# Patient Record
Sex: FEMALE | Race: BLACK OR AFRICAN AMERICAN | NOT HISPANIC OR LATINO | Employment: OTHER | URBAN - METROPOLITAN AREA
[De-identification: names, ages, dates, MRNs, and addresses within clinical notes are randomized per-mention and may not be internally consistent; named-entity substitution may affect disease eponyms.]

---

## 2018-07-31 ENCOUNTER — HOSPITAL ENCOUNTER (EMERGENCY)
Facility: HOSPITAL | Age: 58
Discharge: HOME | End: 2018-07-31
Attending: EMERGENCY MEDICINE
Payer: MEDICAID

## 2018-07-31 VITALS
SYSTOLIC BLOOD PRESSURE: 130 MMHG | TEMPERATURE: 98 F | HEIGHT: 66 IN | DIASTOLIC BLOOD PRESSURE: 79 MMHG | OXYGEN SATURATION: 98 % | WEIGHT: 230 LBS | RESPIRATION RATE: 18 BRPM | BODY MASS INDEX: 36.96 KG/M2 | HEART RATE: 95 BPM

## 2018-07-31 DIAGNOSIS — M62.838 MUSCLE SPASMS OF NECK: Primary | ICD-10-CM

## 2018-07-31 DIAGNOSIS — R73.9 HYPERGLYCEMIA: ICD-10-CM

## 2018-07-31 LAB
ALBUMIN SERPL-MCNC: 3.8 G/DL (ref 3.3–5.5)
ALP SERPL-CCNC: 101 U/L (ref 42–141)
BILIRUB SERPL-MCNC: 0.5 MG/DL (ref 0.2–1.6)
BUN SERPL-MCNC: 8 MG/DL (ref 7–22)
CALCIUM SERPL-MCNC: 10.1 MG/DL (ref 8–10.3)
CHLORIDE SERPL-SCNC: 97 MMOL/L (ref 98–108)
CREAT SERPL-MCNC: 0.9 MG/DL (ref 0.6–1.2)
GLUCOSE SERPL-MCNC: 294 MG/DL (ref 73–118)
POC ALT (SGPT): 17 U/L (ref 10–47)
POC AST (SGOT): 25 U/L (ref 11–38)
POC CARDIAC TROPONIN I: 0 NG/ML
POC TCO2: 28 MMOL/L (ref 18–33)
POCT GLUCOSE: 286 MG/DL (ref 70–110)
POCT GLUCOSE: 320 MG/DL (ref 70–110)
POTASSIUM BLD-SCNC: 4 MMOL/L (ref 3.6–5.1)
PROTEIN, POC: 7.8 G/DL (ref 6.4–8.1)
SAMPLE: NORMAL
SODIUM BLD-SCNC: 138 MMOL/L (ref 128–145)

## 2018-07-31 PROCEDURE — 25000003 PHARM REV CODE 250: Performed by: EMERGENCY MEDICINE

## 2018-07-31 PROCEDURE — 93010 ELECTROCARDIOGRAM REPORT: CPT | Mod: ,,, | Performed by: INTERNAL MEDICINE

## 2018-07-31 PROCEDURE — 99284 EMERGENCY DEPT VISIT MOD MDM: CPT | Mod: 25

## 2018-07-31 PROCEDURE — 93005 ELECTROCARDIOGRAM TRACING: CPT

## 2018-07-31 PROCEDURE — 80053 COMPREHEN METABOLIC PANEL: CPT

## 2018-07-31 PROCEDURE — 63600175 PHARM REV CODE 636 W HCPCS: Performed by: EMERGENCY MEDICINE

## 2018-07-31 PROCEDURE — 84484 ASSAY OF TROPONIN QUANT: CPT

## 2018-07-31 PROCEDURE — 85025 COMPLETE CBC W/AUTO DIFF WBC: CPT

## 2018-07-31 PROCEDURE — 96361 HYDRATE IV INFUSION ADD-ON: CPT

## 2018-07-31 PROCEDURE — 96374 THER/PROPH/DIAG INJ IV PUSH: CPT

## 2018-07-31 RX ORDER — AMLODIPINE BESYLATE 10 MG/1
10 TABLET ORAL DAILY
Status: ON HOLD | COMMUNITY
End: 2020-04-27 | Stop reason: HOSPADM

## 2018-07-31 RX ORDER — LISINOPRIL 40 MG/1
40 TABLET ORAL DAILY
COMMUNITY

## 2018-07-31 RX ORDER — FERROUS SULFATE 325(65) MG
325 TABLET ORAL
COMMUNITY

## 2018-07-31 RX ORDER — GLIMEPIRIDE 4 MG/1
4 TABLET ORAL
Status: ON HOLD | COMMUNITY
End: 2020-04-26

## 2018-07-31 RX ORDER — KETOROLAC TROMETHAMINE 10 MG/1
10 TABLET, FILM COATED ORAL EVERY 6 HOURS
Qty: 20 TABLET | Refills: 0 | Status: SHIPPED | OUTPATIENT
Start: 2018-07-31 | End: 2018-11-20 | Stop reason: ALTCHOICE

## 2018-07-31 RX ORDER — PRAVASTATIN SODIUM 80 MG/1
80 TABLET ORAL DAILY
COMMUNITY
End: 2020-08-07

## 2018-07-31 RX ORDER — DIAZEPAM 10 MG/1
10 TABLET ORAL EVERY 8 HOURS PRN
Qty: 12 TABLET | Refills: 0 | Status: SHIPPED | OUTPATIENT
Start: 2018-07-31 | End: 2020-08-13 | Stop reason: CLARIF

## 2018-07-31 RX ORDER — CHLORTHALIDONE 25 MG/1
25 TABLET ORAL DAILY
COMMUNITY

## 2018-07-31 RX ORDER — METOPROLOL SUCCINATE 50 MG/1
100 TABLET, EXTENDED RELEASE ORAL DAILY
COMMUNITY

## 2018-07-31 RX ORDER — POTASSIUM CHLORIDE 750 MG/1
10 CAPSULE, EXTENDED RELEASE ORAL ONCE
COMMUNITY

## 2018-07-31 RX ORDER — KETOROLAC TROMETHAMINE 30 MG/ML
30 INJECTION, SOLUTION INTRAMUSCULAR; INTRAVENOUS
Status: COMPLETED | OUTPATIENT
Start: 2018-07-31 | End: 2018-07-31

## 2018-07-31 RX ORDER — METFORMIN HYDROCHLORIDE 500 MG/1
500 TABLET ORAL 2 TIMES DAILY WITH MEALS
Status: ON HOLD | COMMUNITY
End: 2020-04-27 | Stop reason: HOSPADM

## 2018-07-31 RX ADMIN — KETOROLAC TROMETHAMINE 30 MG: 30 INJECTION, SOLUTION INTRAMUSCULAR at 09:07

## 2018-07-31 RX ADMIN — SODIUM CHLORIDE 1000 ML: 0.9 INJECTION, SOLUTION INTRAVENOUS at 08:07

## 2018-07-31 NOTE — ED PROVIDER NOTES
Encounter Date: 7/31/2018       History     Chief Complaint   Patient presents with    Neck Pain     pt presents to ED with c/o left right sided neck,shoulder,arm pain that started saturday night.     Shoulder Pain    Arm Pain     Chief complaint:  Shoulder pain   57-year-old complains of hard pain to her right shoulder and trapezius area for the last 3 days.  Pain has been constant and worsens with any movement.  She rates her pain as 10/10.  She has been taking ibuprofen without improvement.  She denies trauma.  No chest pain or shortness of breath. She denies weakness or numbness to her arm.  Pain radiates to her finger tips          Review of patient's allergies indicates:  No Known Allergies  Past Medical History:   Diagnosis Date    Anemia     Diabetes mellitus     Hyperlipidemia     Hypertension      History reviewed. No pertinent surgical history.  History reviewed. No pertinent family history.  Social History   Substance Use Topics    Smoking status: Never Smoker    Smokeless tobacco: Never Used    Alcohol use No     Review of Systems   Constitutional: Negative for fever.   HENT: Negative for sore throat.    Respiratory: Negative for shortness of breath.    Cardiovascular: Negative for chest pain.   Gastrointestinal: Negative for nausea.   Genitourinary: Negative for dysuria.   Musculoskeletal: Positive for neck pain. Negative for back pain.        Right shoulder and trapezius area pain   Skin: Negative for rash.   Neurological: Negative for weakness.   All other systems reviewed and are negative.      Physical Exam     Initial Vitals [07/31/18 0826]   BP Pulse Resp Temp SpO2   137/84 95 16 97.5 °F (36.4 °C) 98 %      MAP       --         Physical Exam    Nursing note and vitals reviewed.  Constitutional: She appears well-developed and well-nourished. She appears distressed.   HENT:   Head: Normocephalic and atraumatic.   Eyes: Conjunctivae and EOM are normal. Pupils are equal, round, and  reactive to light.   Neck: Normal range of motion. Neck supple.   Cardiovascular: Normal rate and regular rhythm.   Pulmonary/Chest: Breath sounds normal.   Abdominal: Soft. There is no tenderness. There is no rebound and no guarding.   Musculoskeletal:        Right shoulder: She exhibits decreased range of motion. She exhibits no bony tenderness, no swelling, normal pulse and normal strength.        Arms:  Neurological: She is alert and oriented to person, place, and time. She has normal strength. No sensory deficit.   Skin: Skin is warm and dry.   Psychiatric: She has a normal mood and affect. She is agitated.         ED Course   Procedures  Labs Reviewed   POCT GLUCOSE - Abnormal; Notable for the following:        Result Value    POCT Glucose 320 (*)     All other components within normal limits   POCT GLUCOSE   POCT CMP   POCT TROPONIN     EKG Readings: (Independently Interpreted)   Normal sinus rhythm, heart rate 75, no ST segment elevation, normal QT, normal axis       Imaging Results    None          Medical Decision Making:   Initial Assessment:   57-year-old complains of pain to her right shoulder and trapezius area.  On exam patient is tender to touch over the trapezius.  No neurological deficit  ED Management:  Patient's blood sugar is 320.  She will be given IV fluids.  CMP will be checked.  Patient be given Valium for the pain.  Troponin and EKG will also be done secondary to history of diabetes.  Patient's labs were significant only for hyperglycemia.  Kidney function is normal.  Patient is not anemic.  Troponin is negative.  Patient's blood sugar improved after IV fluids.  Her pain also improved at the Valium and Toradol.  Patient will be discharged on these medications.  She was tries to use a heating pad to her neck.                      Clinical Impression:   The primary encounter diagnosis was Muscle spasms of neck. A diagnosis of Hyperglycemia was also pertinent to this visit.                              Trupti Elizalde MD  07/31/18 0908

## 2018-11-20 ENCOUNTER — OFFICE VISIT (OUTPATIENT)
Dept: ORTHOPEDICS | Facility: CLINIC | Age: 58
End: 2018-11-20
Payer: MEDICAID

## 2018-11-20 ENCOUNTER — TELEPHONE (OUTPATIENT)
Dept: ORTHOPEDICS | Facility: CLINIC | Age: 58
End: 2018-11-20

## 2018-11-20 VITALS — BODY MASS INDEX: 36.96 KG/M2 | WEIGHT: 230 LBS | HEIGHT: 66 IN

## 2018-11-20 DIAGNOSIS — M25.562 PAIN IN BOTH KNEES, UNSPECIFIED CHRONICITY: Primary | ICD-10-CM

## 2018-11-20 DIAGNOSIS — M17.0 PRIMARY OSTEOARTHRITIS OF BOTH KNEES: Primary | ICD-10-CM

## 2018-11-20 DIAGNOSIS — M25.561 PAIN IN BOTH KNEES, UNSPECIFIED CHRONICITY: Primary | ICD-10-CM

## 2018-11-20 PROCEDURE — 99203 OFFICE O/P NEW LOW 30 MIN: CPT | Mod: S$PBB,,, | Performed by: ORTHOPAEDIC SURGERY

## 2018-11-20 PROCEDURE — 99213 OFFICE O/P EST LOW 20 MIN: CPT | Mod: PBBFAC,PN | Performed by: ORTHOPAEDIC SURGERY

## 2018-11-20 PROCEDURE — 99999 PR PBB SHADOW E&M-EST. PATIENT-LVL III: CPT | Mod: PBBFAC,,, | Performed by: ORTHOPAEDIC SURGERY

## 2018-11-20 RX ORDER — MELOXICAM 15 MG/1
15 TABLET ORAL DAILY
Qty: 30 TABLET | Refills: 2 | Status: SHIPPED | OUTPATIENT
Start: 2018-11-20

## 2018-11-20 NOTE — TELEPHONE ENCOUNTER
Left message for patient in regards to RX being called in to [facility] .   ----- Message from Michelle Mendoza sent at 11/20/2018 12:08 PM CST -----  Contact: [phone] or [phone]/ self   Patient requesting to speak with you regarding the following Rx being sent to [facility], [place]. Please advise.     Pharmacy Phone Number-  [phone]    1. meloxicam (MOBIC) 15 MG tablet  Sig: Take 1 tablet (15 mg total) by mouth once daily

## 2018-11-20 NOTE — LETTER
November 20, 2018      Justo Rico MD  [address]  [place] [address]           [facility]  [address]  [place] [address]  Phone: [phone]  Fax: [phone]          Patient: Gala Alcaraz   MR Number: [id number]   YOB: 1960   Date of Visit: 11/20/2018       Dear Dr. Justo Rico:    Thank you for referring Gala Alcaraz to me for evaluation. Attached you will find relevant portions of my assessment and plan of care.    If you have questions, please do not hesitate to call me. I look forward to following Gala Alcaraz along with you.    Sincerely,    Anand Seymour MD    Enclosure  CC:  No Recipients    If you would like to receive this communication electronically, please contact [email or web address] or [phone] to request more information on [facility] access.    For providers and/or their staff who would like to refer a patient to [facility], please contact us through our one-stop-shop provider referral line, [facility], at [phone].    If you feel you have received this communication in error or would no longer like to receive these types of communications, please e-mail [email or web address]

## 2018-11-20 NOTE — PROGRESS NOTES
Subjective:      Patient ID: Gala Alcaraz is a 57 y.o. female.    Chief Complaint: Pain of the Left Knee and Pain of the Right Knee    HPI     They have experienced problems with their bilateral knee over the past 5 years. The patient denies relevant history of injury/aggravation. Pain is located  anteriorly Associated symptoms include swelling and gelling.  Symptoms are aggravated by prolonged walking. They have been treated with NA.   Symptoms have recently stayed the same. Ambulation reportedly has not been impaired. Self care ADLs are not painful.     Review of Systems   Constitution: Negative for fever and weight loss.   HENT: Negative for congestion.    Eyes: Negative for visual disturbance.   Cardiovascular: Negative for chest pain.   Respiratory: Negative for shortness of breath.    Hematologic/Lymphatic: Negative for bleeding problem. Does not bruise/bleed easily.   Skin: Negative for poor wound healing.   Musculoskeletal: Positive for joint pain.   Gastrointestinal: Negative for abdominal pain.   Genitourinary: Negative for dysuria.   Neurological: Negative for seizures.   Psychiatric/Behavioral: Negative for altered mental status.   Allergic/Immunologic: Negative for persistent infections.         Objective:            Ortho/SPM Exam  Right knee  [unfilled]    The patient is not in acute distress.   Sclerae normal  Body habitus is obese.  Respiratory distress:  none   The patient walks without a limp.  Hip irritability  negative.   The skin over the knee is intact.  Knee effusion trace  Tendernes is located none  Range of motion- Flexion 125 deg, Extension 0 deg,   Ligament laxity exam:   MCL 1+   Lachman negative   Post sag negative    LCL 0  Patellar apprehension negative.  Popliteal cyst negative  Patellar crepitation present.  Flexion/pinch not applicable  Pulses DP intact, PT intact.  Motor normal 5/5 strength in all tested muscle groups.   Sensory normal.    The left knee is identical    I reviewed  the relevant radiographic images for the patient's condition:  Recent films of both knees show advanced tricompartmental loss of joint space with varus deformity        Assessment:       Encounter Diagnosis   Name Primary?    Primary osteoarthritis of both knees Yes            The patient's radiographic findings are such that her functional status is better than would be expected.  Plan:       Gala ESPINAL was seen today for pain and pain.    Diagnoses and all orders for this visit:    Primary osteoarthritis of both knees          I explained my diagnostic impression and the reasoning behind it in detail, using layman's terms.  Models and/or pictures were used to help in the explanation.    The usual nonsurgical options were discussed. Considering the patient's report of regular exercise I recommend that we start meloxicam and that she continue on her home exercise program.    I explained to the patient that I am providing a prescription for anti-inflammatory medication.  I warned the patient that it should not be taken with other anti-inflammatory medications including over-the-counter products containing ibuprofen and naproxen.  I advised them to consult their primary physician or pharmacist if there is any question about this in the future.  I discussed the possible side effects including cardiovascular issues, renal issues and gastrointestinal problems.  I advised the patient to discontinue the medication should they develop persistent symptoms of dyspepsia.    I also explained that should they elect to continue this medication long-term, that is beyond the prescription that I provide at this time, they should contact their primary physician for refills and appropriate monitoring.    I explained the potential role of surgery in the treatment of this condition to the patient.  They understand that if nonsurgical measures do not adequately control symptoms, surgery will be considered in the future.

## 2020-04-26 ENCOUNTER — HOSPITAL ENCOUNTER (OUTPATIENT)
Facility: HOSPITAL | Age: 60
Discharge: HOME | End: 2020-04-27
Attending: EMERGENCY MEDICINE | Admitting: EMERGENCY MEDICINE
Payer: MEDICAID

## 2020-04-26 DIAGNOSIS — K21.9 GASTROESOPHAGEAL REFLUX DISEASE, ESOPHAGITIS PRESENCE NOT SPECIFIED: ICD-10-CM

## 2020-04-26 DIAGNOSIS — E78.5 HYPERLIPIDEMIA, UNSPECIFIED HYPERLIPIDEMIA TYPE: Chronic | ICD-10-CM

## 2020-04-26 DIAGNOSIS — R07.9 ACUTE CHEST PAIN: ICD-10-CM

## 2020-04-26 DIAGNOSIS — I10 ESSENTIAL HYPERTENSION: Chronic | ICD-10-CM

## 2020-04-26 DIAGNOSIS — R07.9 CHEST PAIN: ICD-10-CM

## 2020-04-26 DIAGNOSIS — I10 HYPERTENSION, UNSPECIFIED TYPE: ICD-10-CM

## 2020-04-26 DIAGNOSIS — E08.65 DIABETES MELLITUS DUE TO UNDERLYING CONDITION WITH HYPERGLYCEMIA, UNSPECIFIED WHETHER LONG TERM INSULIN USE: ICD-10-CM

## 2020-04-26 DIAGNOSIS — R93.89 ABNORMAL CHEST CT: ICD-10-CM

## 2020-04-26 DIAGNOSIS — R07.9 CHEST PAIN, UNSPECIFIED TYPE: Primary | ICD-10-CM

## 2020-04-26 PROBLEM — E11.9 DM (DIABETES MELLITUS): Chronic | Status: ACTIVE | Noted: 2020-04-26

## 2020-04-26 PROBLEM — D64.9 ANEMIA: Chronic | Status: ACTIVE | Noted: 2020-04-26

## 2020-04-26 PROBLEM — E66.9 OBESITY (BMI 30-39.9): Chronic | Status: ACTIVE | Noted: 2020-04-26

## 2020-04-26 LAB
ALBUMIN SERPL-MCNC: 3.6 G/DL (ref 3.3–5.5)
ALP SERPL-CCNC: 96 U/L (ref 42–141)
BILIRUB SERPL-MCNC: 0.5 MG/DL (ref 0.2–1.6)
BUN SERPL-MCNC: 14 MG/DL (ref 7–22)
CALCIUM SERPL-MCNC: 10.4 MG/DL (ref 8–10.3)
CHLORIDE SERPL-SCNC: 99 MMOL/L (ref 98–108)
CHOLEST SERPL-MCNC: 180 MG/DL (ref 120–199)
CHOLEST/HDLC SERPL: 3.8 {RATIO} (ref 2–5)
CREAT SERPL-MCNC: 0.9 MG/DL (ref 0.6–1.2)
CRP SERPL-MCNC: 6 MG/L (ref 0–8.2)
FERRITIN SERPL-MCNC: 31 NG/ML (ref 20–300)
GLUCOSE SERPL-MCNC: 358 MG/DL (ref 73–118)
HDLC SERPL-MCNC: 48 MG/DL (ref 40–75)
HDLC SERPL: 26.7 % (ref 20–50)
LDH SERPL L TO P-CCNC: 287 U/L (ref 110–260)
LDLC SERPL CALC-MCNC: 95.8 MG/DL (ref 63–159)
NONHDLC SERPL-MCNC: 132 MG/DL
POC ALT (SGPT): 14 U/L (ref 10–47)
POC AST (SGOT): 23 U/L (ref 11–38)
POC B-TYPE NATRIURETIC PEPTIDE: 143 PG/ML (ref 0–100)
POC CARDIAC TROPONIN I: 0 NG/ML
POC D-DI: 594 NG/ML (ref 0–450)
POC PTINR: 1 (ref 0.9–1.2)
POC PTWBT: 12.1 SEC (ref 9.7–14.3)
POC TCO2: 30 MMOL/L (ref 18–33)
POCT GLUCOSE: 348 MG/DL (ref 70–110)
POCT GLUCOSE: 377 MG/DL (ref 70–110)
POTASSIUM BLD-SCNC: 4.4 MMOL/L (ref 3.6–5.1)
PROCALCITONIN SERPL IA-MCNC: 0.03 NG/ML
PROTEIN, POC: 7.3 G/DL (ref 6.4–8.1)
SAMPLE: NORMAL
SAMPLE: NORMAL
SARS-COV-2 RDRP RESP QL NAA+PROBE: NEGATIVE
SODIUM BLD-SCNC: 140 MMOL/L (ref 128–145)
TRIGL SERPL-MCNC: 181 MG/DL (ref 30–150)
TROPONIN I SERPL DL<=0.01 NG/ML-MCNC: 0.03 NG/ML (ref 0–0.03)
TSH SERPL DL<=0.005 MIU/L-ACNC: 0.49 UIU/ML (ref 0.4–4)

## 2020-04-26 PROCEDURE — 83615 LACTATE (LD) (LDH) ENZYME: CPT

## 2020-04-26 PROCEDURE — 84484 ASSAY OF TROPONIN QUANT: CPT

## 2020-04-26 PROCEDURE — U0002 COVID-19 LAB TEST NON-CDC: HCPCS

## 2020-04-26 PROCEDURE — 82728 ASSAY OF FERRITIN: CPT

## 2020-04-26 PROCEDURE — 99291 CRITICAL CARE FIRST HOUR: CPT | Mod: 25,ER

## 2020-04-26 PROCEDURE — 80061 LIPID PANEL: CPT

## 2020-04-26 PROCEDURE — 96374 THER/PROPH/DIAG INJ IV PUSH: CPT

## 2020-04-26 PROCEDURE — 84145 PROCALCITONIN (PCT): CPT

## 2020-04-26 PROCEDURE — 85025 COMPLETE CBC W/AUTO DIFF WBC: CPT | Mod: ER

## 2020-04-26 PROCEDURE — 93010 ELECTROCARDIOGRAM REPORT: CPT | Mod: ,,, | Performed by: INTERNAL MEDICINE

## 2020-04-26 PROCEDURE — 84484 ASSAY OF TROPONIN QUANT: CPT | Mod: ER

## 2020-04-26 PROCEDURE — 36415 COLL VENOUS BLD VENIPUNCTURE: CPT

## 2020-04-26 PROCEDURE — 25000003 PHARM REV CODE 250: Performed by: HOSPITALIST

## 2020-04-26 PROCEDURE — 85610 PROTHROMBIN TIME: CPT | Mod: ER

## 2020-04-26 PROCEDURE — 86140 C-REACTIVE PROTEIN: CPT

## 2020-04-26 PROCEDURE — 96372 THER/PROPH/DIAG INJ SC/IM: CPT | Mod: 59

## 2020-04-26 PROCEDURE — 63600175 PHARM REV CODE 636 W HCPCS: Performed by: HOSPITALIST

## 2020-04-26 PROCEDURE — 93010 EKG 12-LEAD: ICD-10-PCS | Mod: ,,, | Performed by: INTERNAL MEDICINE

## 2020-04-26 PROCEDURE — 80053 COMPREHEN METABOLIC PANEL: CPT | Mod: ER

## 2020-04-26 PROCEDURE — 25000003 PHARM REV CODE 250: Mod: ER | Performed by: EMERGENCY MEDICINE

## 2020-04-26 PROCEDURE — 25500020 PHARM REV CODE 255: Mod: ER | Performed by: EMERGENCY MEDICINE

## 2020-04-26 PROCEDURE — 84443 ASSAY THYROID STIM HORMONE: CPT

## 2020-04-26 PROCEDURE — 83880 ASSAY OF NATRIURETIC PEPTIDE: CPT | Mod: ER

## 2020-04-26 PROCEDURE — C9399 UNCLASSIFIED DRUGS OR BIOLOG: HCPCS | Performed by: HOSPITALIST

## 2020-04-26 PROCEDURE — 93005 ELECTROCARDIOGRAM TRACING: CPT | Mod: ER

## 2020-04-26 PROCEDURE — 82962 GLUCOSE BLOOD TEST: CPT | Mod: ER

## 2020-04-26 PROCEDURE — 85379 FIBRIN DEGRADATION QUANT: CPT | Mod: ER

## 2020-04-26 PROCEDURE — C9113 INJ PANTOPRAZOLE SODIUM, VIA: HCPCS | Performed by: HOSPITALIST

## 2020-04-26 PROCEDURE — 25000242 PHARM REV CODE 250 ALT 637 W/ HCPCS: Mod: ER | Performed by: EMERGENCY MEDICINE

## 2020-04-26 RX ORDER — ASPIRIN 325 MG
325 TABLET ORAL
Status: COMPLETED | OUTPATIENT
Start: 2020-04-26 | End: 2020-04-26

## 2020-04-26 RX ORDER — ACETAMINOPHEN 325 MG/1
650 TABLET ORAL EVERY 8 HOURS PRN
Status: DISCONTINUED | OUTPATIENT
Start: 2020-04-26 | End: 2020-04-27 | Stop reason: HOSPADM

## 2020-04-26 RX ORDER — SODIUM CHLORIDE 0.9 % (FLUSH) 0.9 %
10 SYRINGE (ML) INJECTION
Status: DISCONTINUED | OUTPATIENT
Start: 2020-04-26 | End: 2020-04-27 | Stop reason: HOSPADM

## 2020-04-26 RX ORDER — NITROGLYCERIN 0.4 MG/1
0.4 TABLET SUBLINGUAL EVERY 5 MIN PRN
Status: DISCONTINUED | OUTPATIENT
Start: 2020-04-26 | End: 2020-04-27 | Stop reason: HOSPADM

## 2020-04-26 RX ORDER — INSULIN ASPART 100 [IU]/ML
1-10 INJECTION, SOLUTION INTRAVENOUS; SUBCUTANEOUS
Status: DISCONTINUED | OUTPATIENT
Start: 2020-04-26 | End: 2020-04-27 | Stop reason: HOSPADM

## 2020-04-26 RX ORDER — ONDANSETRON 2 MG/ML
4 INJECTION INTRAMUSCULAR; INTRAVENOUS EVERY 8 HOURS PRN
Status: DISCONTINUED | OUTPATIENT
Start: 2020-04-26 | End: 2020-04-27 | Stop reason: HOSPADM

## 2020-04-26 RX ORDER — NAPROXEN SODIUM 220 MG/1
81 TABLET, FILM COATED ORAL DAILY
Status: DISCONTINUED | OUTPATIENT
Start: 2020-04-27 | End: 2020-04-27 | Stop reason: HOSPADM

## 2020-04-26 RX ORDER — ENOXAPARIN SODIUM 100 MG/ML
40 INJECTION SUBCUTANEOUS ONCE
Status: DISCONTINUED | OUTPATIENT
Start: 2020-04-26 | End: 2020-04-27

## 2020-04-26 RX ORDER — INSULIN GLARGINE 100 [IU]/ML
12 INJECTION, SOLUTION SUBCUTANEOUS NIGHTLY
COMMUNITY

## 2020-04-26 RX ORDER — FAMOTIDINE 20 MG/1
20 TABLET, FILM COATED ORAL 2 TIMES DAILY
Status: DISCONTINUED | OUTPATIENT
Start: 2020-04-26 | End: 2020-04-26

## 2020-04-26 RX ORDER — GLUCAGON 1 MG
1 KIT INJECTION
Status: DISCONTINUED | OUTPATIENT
Start: 2020-04-26 | End: 2020-04-27 | Stop reason: HOSPADM

## 2020-04-26 RX ORDER — IBUPROFEN 200 MG
16 TABLET ORAL
Status: DISCONTINUED | OUTPATIENT
Start: 2020-04-26 | End: 2020-04-27 | Stop reason: HOSPADM

## 2020-04-26 RX ORDER — TALC
6 POWDER (GRAM) TOPICAL NIGHTLY PRN
Status: DISCONTINUED | OUTPATIENT
Start: 2020-04-26 | End: 2020-04-27 | Stop reason: HOSPADM

## 2020-04-26 RX ORDER — PANTOPRAZOLE SODIUM 40 MG/10ML
40 INJECTION, POWDER, LYOPHILIZED, FOR SOLUTION INTRAVENOUS ONCE
Status: DISCONTINUED | OUTPATIENT
Start: 2020-04-26 | End: 2020-04-26

## 2020-04-26 RX ORDER — CHLORTHALIDONE 25 MG/1
25 TABLET ORAL DAILY
Status: DISCONTINUED | OUTPATIENT
Start: 2020-04-27 | End: 2020-04-27 | Stop reason: HOSPADM

## 2020-04-26 RX ORDER — PANTOPRAZOLE SODIUM 40 MG/10ML
40 INJECTION, POWDER, LYOPHILIZED, FOR SOLUTION INTRAVENOUS DAILY
Status: DISCONTINUED | OUTPATIENT
Start: 2020-04-26 | End: 2020-04-27 | Stop reason: HOSPADM

## 2020-04-26 RX ORDER — ENOXAPARIN SODIUM 100 MG/ML
40 INJECTION SUBCUTANEOUS EVERY 24 HOURS
Status: DISCONTINUED | OUTPATIENT
Start: 2020-04-26 | End: 2020-04-27 | Stop reason: HOSPADM

## 2020-04-26 RX ORDER — LISINOPRIL 20 MG/1
40 TABLET ORAL DAILY
Status: DISCONTINUED | OUTPATIENT
Start: 2020-04-27 | End: 2020-04-27 | Stop reason: HOSPADM

## 2020-04-26 RX ORDER — INSULIN ASPART 100 [IU]/ML
8 INJECTION, SOLUTION INTRAVENOUS; SUBCUTANEOUS
COMMUNITY

## 2020-04-26 RX ORDER — PRAVASTATIN SODIUM 40 MG/1
80 TABLET ORAL DAILY
Status: DISCONTINUED | OUTPATIENT
Start: 2020-04-27 | End: 2020-04-27

## 2020-04-26 RX ORDER — FERROUS SULFATE 325(65) MG
325 TABLET, DELAYED RELEASE (ENTERIC COATED) ORAL DAILY
Status: DISCONTINUED | OUTPATIENT
Start: 2020-04-27 | End: 2020-04-27 | Stop reason: HOSPADM

## 2020-04-26 RX ORDER — NITROGLYCERIN 0.4 MG/1
0.4 TABLET SUBLINGUAL
Status: COMPLETED | OUTPATIENT
Start: 2020-04-26 | End: 2020-04-26

## 2020-04-26 RX ORDER — IBUPROFEN 200 MG
24 TABLET ORAL
Status: DISCONTINUED | OUTPATIENT
Start: 2020-04-26 | End: 2020-04-27 | Stop reason: HOSPADM

## 2020-04-26 RX ORDER — METOPROLOL SUCCINATE 50 MG/1
100 TABLET, EXTENDED RELEASE ORAL DAILY
Status: DISCONTINUED | OUTPATIENT
Start: 2020-04-27 | End: 2020-04-27 | Stop reason: HOSPADM

## 2020-04-26 RX ORDER — ENOXAPARIN SODIUM 100 MG/ML
40 INJECTION SUBCUTANEOUS ONCE
Status: DISCONTINUED | OUTPATIENT
Start: 2020-04-26 | End: 2020-04-26

## 2020-04-26 RX ADMIN — IOHEXOL 100 ML: 350 INJECTION, SOLUTION INTRAVENOUS at 02:04

## 2020-04-26 RX ADMIN — INSULIN DETEMIR 8 UNITS: 100 INJECTION, SOLUTION SUBCUTANEOUS at 09:04

## 2020-04-26 RX ADMIN — NITROGLYCERIN 0.4 MG: 0.4 TABLET SUBLINGUAL at 01:04

## 2020-04-26 RX ADMIN — PANTOPRAZOLE SODIUM 40 MG: 40 INJECTION, POWDER, FOR SOLUTION INTRAVENOUS at 08:04

## 2020-04-26 RX ADMIN — ASPIRIN 325 MG ORAL TABLET 325 MG: 325 PILL ORAL at 12:04

## 2020-04-26 RX ADMIN — MELATONIN 6 MG: at 08:04

## 2020-04-26 RX ADMIN — ENOXAPARIN SODIUM 40 MG: 40 INJECTION SUBCUTANEOUS at 08:04

## 2020-04-26 NOTE — ED TRIAGE NOTES
Pt to er c/o midsternal CP for 1 week without improvement--denies trauma, cough, SOB, and fever--tender to palp--Tylenol  Decreases pain per pt--mask in place

## 2020-04-26 NOTE — ASSESSMENT & PLAN NOTE
The patient has been counseled on the negative impact that obesity imparts on her health and was encouraged to make lifestyle changes in order to lose weight and decrease her modifiable risk factors.  - ADA/Cardiac Diet  -Nursing Education and reference material (pamphlets)

## 2020-04-26 NOTE — ED NOTES
Attempted to call report and was told that the house supervisor had not decided where the pt is going to be assigned, so they will call back--[facility] and charge RN notified

## 2020-04-26 NOTE — SUBJECTIVE & OBJECTIVE
Past Medical History:   Diagnosis Date    Anemia     Diabetes mellitus     Hyperlipidemia     Hypertension        Past Surgical History:   Procedure Laterality Date    HYSTERECTOMY         Review of patient's allergies indicates:  No Known Allergies    No current facility-administered medications on file prior to encounter.      Current Outpatient Medications on File Prior to Encounter   Medication Sig    chlorthalidone (HYGROTEN) 25 MG Tab Take 25 mg by mouth once daily.    ferrous sulfate 325 mg (65 mg iron) Tab tablet Take 325 mg by mouth daily with breakfast.    glimepiride (AMARYL) 4 MG tablet Take 4 mg by mouth before breakfast.    lisinopril (PRINIVIL,ZESTRIL) 40 MG tablet Take 40 mg by mouth once daily.    meloxicam (MOBIC) 15 MG tablet Take 1 tablet (15 mg total) by mouth once daily.    metoprolol succinate (TOPROL-XL) 50 MG 24 hr tablet Take 50 mg by mouth once daily.    potassium chloride (MICRO-K) 10 MEQ CpSR Take 10 mEq by mouth once.    pravastatin (PRAVACHOL) 80 MG tablet Take 80 mg by mouth once daily.    amLODIPine (NORVASC) 10 MG tablet Take 10 mg by mouth once daily.    diazePAM (VALIUM) 10 MG Tab Take 1 tablet (10 mg total) by mouth every 8 (eight) hours as needed (muscle spasm).    metFORMIN (GLUCOPHAGE) 500 MG tablet Take 500 mg by mouth 2 (two) times daily with meals.    SITagliptin (JANUVIA) 50 MG Tab Take 50 mg by mouth once daily.     Family History     None        Tobacco Use    Smoking status: Never Smoker    Smokeless tobacco: Never Used   Substance and Sexual Activity    Alcohol use: No    Drug use: Not on file    Sexual activity: Not on file     Review of Systems   Constitutional: Negative for appetite change, fatigue and fever.   HENT: Negative for congestion, ear pain, postnasal drip, rhinorrhea, sneezing and sore throat.    Eyes: Negative for photophobia and visual disturbance.   Respiratory: Negative for cough, shortness of breath and wheezing.     Cardiovascular: Positive for chest pain. Negative for leg swelling.   Gastrointestinal: Negative for abdominal distention, abdominal pain, constipation, diarrhea, nausea and vomiting.   Genitourinary: Negative for dysuria, enuresis, frequency, hematuria and urgency.   Musculoskeletal: Negative for back pain and joint swelling.   Skin: Negative for color change.   Neurological: Negative for syncope, weakness, light-headedness, numbness and headaches.   Hematological: Does not bruise/bleed easily.   Psychiatric/Behavioral: Negative for agitation and confusion.     Objective:     Vital Signs (Most Recent):  Temp: 98.2 °F (36.8 °C) (04/26/20 1333)  Pulse: (!) 58 (04/26/20 1453)  Resp: 20 (04/26/20 1333)  BP: (!) 169/90 (04/26/20 1453)  SpO2: 98 % (04/26/20 1453) Vital Signs (24h Range):  Temp:  [98.2 °F (36.8 °C)] 98.2 °F (36.8 °C)  Pulse:  [58-68] 58  Resp:  [18-20] 20  SpO2:  [98 %-100 %] 98 %  BP: (131-222)/() 169/90     Weight: 99.8 kg (220 lb)  Body mass index is 34.98 kg/m².    Physical Exam   Constitutional: She is oriented to person, place, and time. She appears well-developed and well-nourished.   HENT:   Head: Normocephalic and atraumatic.   Mouth/Throat: Oropharynx is clear and moist.   Eyes: Conjunctivae are normal.   Neck: Normal range of motion. Neck supple.   Cardiovascular: Normal rate, regular rhythm, normal heart sounds and intact distal pulses.   No murmur heard.  Pulmonary/Chest: Effort normal and breath sounds normal. She has no wheezes. She has no rales.   Abdominal: Soft. Bowel sounds are normal. She exhibits no distension. There is no tenderness. There is no rebound.   Musculoskeletal: Normal range of motion. She exhibits no edema.   Neurological: She is alert and oriented to person, place, and time. She has normal strength. GCS eye subscore is 4. GCS verbal subscore is 5. GCS motor subscore is 6.   Skin: Skin is warm and dry. Capillary refill takes less than 2 seconds.   Psychiatric: She  has a normal mood and affect. Her behavior is normal. Thought content normal.   Nursing note and vitals reviewed.          Significant Labs:   CBC: No results for input(s): WBC, HGB, HCT, PLT in the last 48 hours.  CMP: No results for input(s): NA, K, CL, CO2, GLU, BUN, CREATININE, CALCIUM, PROT, ALBUMIN, BILITOT, ALKPHOS, AST, ALT, ANIONGAP, EGFRNONAA in the last 48 hours.    Invalid input(s): ESTGFAFRICA  Cardiac Markers: No results for input(s): CKMB, MYOGLOBIN, BNP, TROPISTAT in the last 48 hours.  POCT Glucose:   Recent Labs   Lab 04/26/20  1231   POCTGLUCOSE 348*     Troponin: No results for input(s): TROPONINI in the last 48 hours.  All pertinent labs within the past 24 hours have been reviewed.    Significant Imaging: I have reviewed all pertinent imaging results/findings within the past 24 hours.

## 2020-04-26 NOTE — ASSESSMENT & PLAN NOTE
Poorly Controlled,systolic blood pressure 131-222's  -Vitals  -BMP  -Continue home Lisinopril,Metoprolol tartrate,Chlorthalidone and Pravastatin

## 2020-04-26 NOTE — ED NOTES
Pt to be admitted and transferred to [facility]--NAD--pt resting without complaint--will monitor

## 2020-04-26 NOTE — ASSESSMENT & PLAN NOTE
Poorly-controlled,Random glucose 358 today. Last QktS5p-5.3 on 03/04/20  -[facility] POCT glucose monitoring  -ADA diet  -Basal and Correctional scale insulin,adjust as warranted.

## 2020-04-26 NOTE — ASSESSMENT & PLAN NOTE
Patient currently with  chest pain.  HEART score of 5, serial cardiac markers, which thus far have been negative. EKG reveals no acute ischemia;Chest X-ray reveals bibasilar hazy opacification;CTA chest- bilateral lower lobe ground-glass findings most suggestive of atelectasis.  -Telemetry  -Vitals  -Serial cardiac enzymes   -ASA  -Nitro SL prn  -Supplemental oxygen at 2L NC PRN SOB  -2D Echo  -UA,TSH, lipid panel

## 2020-04-26 NOTE — HPI
"Gala Alcaraz 59 y.o. female PMHx: Hypertension, Diabetes mellitus,HLD, and anemia  presenting with complaints of chest pain x 1 week. Reports continuous pounding midsternal chest pain," feels like someone is hitting me in the chest", non-radiating,and no contributing/alleviating factors. Denies SOB,NVD, focal deficits, numbness or tingling in any extremity, leg or calve pain. Denies ETOH,smoking or illegal substances use.    In ED hypertensive,bradycardia, labs reveals elevated levels of BNP,LD, D-dimer and  (-) COVID. EKG- no acute ischemia,CTA chest- no pulmonary emboli although there is bilateral lower lobe ground-glass findings most suggestive of atelectasis. Received ASA and sublingual Nitroglycerin,chest pain improved,not resolved. Placed in observation to Hospital Medicine for further evaluation and treatment.          "

## 2020-04-26 NOTE — NURSING
Report received from [facility] ED nurse Kimberly.  Patient awaiting ambulance transport to [facility].

## 2020-04-26 NOTE — ED PROVIDER NOTES
Encounter Date: 4/26/2020    SCRIBE #1 NOTE: I, Danyelle Warner, am scribing for, and in the presence of,  Dr. Hernadez. I have scribed the following portions of the note - Other sections scribed: HPI, ROS, PE.       History     Chief Complaint   Patient presents with    Chest Pain     MIDSTERNAL NON RADIATING CHEST PAIN X 3 DAYS; PT STATES IS FEELS LIKE A PRESSURE; DENIES SOB AND N/V     Gala Alcaraz is a 59 y.o. female who presents to the ED complaining of mid sternal non radiating chest pain x 3 days. Patient describes pain as pressure like. She denies SOB, nausea, or vomiting. No alleviating or exacerbating factors. Reports she takes her routine daily medications with good compliance, took blood pressure and diabetes medication this morning.  Has not tried anything for pain.  States sometimes it feels pressure and sometimes it feels like is sticking pain and sometimes pounding.  She denies shortness of breath.  She denies a cough or recent fevers.      The history is provided by the patient. No  was used.     Review of patient's allergies indicates:  No Known Allergies  Past Medical History:   Diagnosis Date    Anemia     Diabetes mellitus     Hyperlipidemia     Hypertension      Past Surgical History:   Procedure Laterality Date    HYSTERECTOMY       No family history on file.  Social History     Tobacco Use    Smoking status: Never Smoker    Smokeless tobacco: Never Used   Substance Use Topics    Alcohol use: No    Drug use: Not on file     Review of Systems   Respiratory: Negative for shortness of breath.    Cardiovascular: Positive for chest pain.   Gastrointestinal: Negative for nausea and vomiting.   All other systems reviewed and are negative.      Physical Exam     Initial Vitals [04/26/20 1223]   BP Pulse Resp Temp SpO2   (!) 222/116 62 18 98.2 °F (36.8 °C) 98 %      MAP       --         Physical Exam    Nursing note and vitals reviewed.  Constitutional: She appears  well-developed and well-nourished.   HENT:   Head: Normocephalic and atraumatic.   Mouth/Throat: Oropharynx is clear and moist.   Eyes: Conjunctivae are normal.   Neck: Normal range of motion. Neck supple.   Cardiovascular: Normal rate, regular rhythm and normal heart sounds.   No murmur heard.  Pulmonary/Chest: Breath sounds normal. No respiratory distress. She has no wheezes. She has no rales.   Abdominal: Soft. She exhibits no distension. There is no tenderness. There is no rebound.   Musculoskeletal: Normal range of motion. She exhibits no edema.   Good perfusion in the extremities.  No calf tenderness or swelling.   Neurological: She is alert and oriented to person, place, and time. She has normal strength. GCS score is 15. GCS eye subscore is 4. GCS verbal subscore is 5. GCS motor subscore is 6.   Skin: Skin is warm and dry.   Psychiatric: She has a normal mood and affect. Thought content normal.         ED Course   Critical Care  Date/Time: 4/26/2020 3:16 PM  Performed by: Tavo Hernadez MD  Authorized by: Tavo Hernadez MD   Direct patient critical care time: 5 minutes  Additional history critical care time: 5 minutes  Ordering / reviewing critical care time: 10 minutes  Documentation critical care time: 5 minutes  Consulting other physicians critical care time: 5 minutes  Total critical care time (exclusive of procedural time) : 30 minutes        Labs Reviewed   LACTATE DEHYDROGENASE - Abnormal; Notable for the following components:       Result Value     (*)     All other components within normal limits   POCT GLUCOSE - Abnormal; Notable for the following components:    POCT Glucose 348 (*)     All other components within normal limits   POCT CMP - Abnormal; Notable for the following components:    Calcium, POC 10.4 (*)     POC Glucose 358 (*)     All other components within normal limits   POCT D DIMER - Abnormal; Notable for the following components:    POC D- (*)     All other  components within normal limits   POCT B-TYPE NATRIURETIC PEPTIDE (BNP) - Abnormal; Notable for the following components:    POC B-Type Natriuretic Peptide 143 (*)     All other components within normal limits   TROPONIN ISTAT   SARS-COV-2 RNA AMPLIFICATION, QUAL    Narrative:     What symptom criteria does the patient meet?->Other (specify)  Please specify:->chest pain   C-REACTIVE PROTEIN   PROCALCITONIN   FERRITIN   POCT CBC   POCT GLUCOSE, HAND-HELD DEVICE   POCT CMP   POCT PROTIME-INR   POCT TROPONIN   POCT B-TYPE NATRIURETIC PEPTIDE (BNP)   POCT D DIMER   ISTAT PROCEDURE         EKG Readings: (Independently Interpreted)   Initial Reading: No STEMI. Previous EKG: Compared with most recent EKG Previous EKG Date: 07/31/2018. Rhythm: Sinus Bradycardia. Heart Rate: 59 bpm . Ectopy: No Ectopy. T Waves Flipped: III and AVF. Other Impression: Abnormal EKG. T wave abnormality.        Imaging Results          CTA Chest Non-Coronary (PE Study) (Final result)  Result time 04/26/20 14:17:30    Final result by Clovis Alfonso MD (04/26/20 14:17:30)                 Impression:      1. No pulmonary embolus identified  2. Cardiomegaly.  Bilateral lower lung ground-glass findings felt to be related to atelectasis/hypoaeration changes.  Correlate clinically.      Electronically signed by: Clovis Alfonso MD  Date:    04/26/2020  Time:    14:17             Narrative:    EXAMINATION:  CTA CHEST NON CORONARY    CLINICAL HISTORY:  Dyspnea, cardiac origin suspected;    TECHNIQUE:  Low dose axial images, sagittal and coronal reformations were obtained from the thoracic inlet to the lung bases following the IV administration of 100 mL of Omnipaque 350.  Contrast timing was optimized to evaluate the pulmonary arteries.  MIP images were performed.    COMPARISON:  Prior radiograph    FINDINGS:  No pulmonary emboli appreciated.    Thoracic aorta and great vessels are unremarkable.  Cardiac enlargement noted without pericardial or  pleural effusion.  No pathologically enlarged axillary, mediastinal or hilar lymph nodes noted.    Lungs demonstrate bilateral lower lobe ground-glass findings most suggestive of atelectasis.    Visualized upper abdominal structures demonstrate no acute abnormality.  No acute osseous abnormality.                               X-Ray Chest AP Portable (Final result)  Result time 04/26/20 13:03:45    Final result by Kenneth Rosales MD (04/26/20 13:03:45)                 Impression:      Bibasilar hazy opacification which could be related to artifact from chest wall soft tissues with underpenetration; however, small pleural effusion with atelectasis/infiltrate particularly on the left not entirely excluded the proper clinical setting.      Electronically signed by: Kenneth Rosales MD  Date:    04/26/2020  Time:    13:03             Narrative:    EXAMINATION:  XR CHEST AP PORTABLE    CLINICAL HISTORY:  Chest Pain;    TECHNIQUE:  Single frontal view of the chest was performed.    COMPARISON:  None    FINDINGS:  Resolution is limited by body habitus with underpenetration.    Cardiac silhouette is upper limits of normal in size without evidence of failure.  Mediastinal contours are within normal limits.  Pulmonary vasculature and hilar contours are within normal limits.    Hazy opacification of the bilateral lung bases, more so on the left with poor visualization of the left costophrenic angle.  The upper lung zones are clear.  No pneumothorax.  No acute osseous process seen.  PA and lateral views can be obtained.                                 Medical Decision Making:   History:   Old Medical Records: I decided to obtain old medical records.  Independently Interpreted Test(s):   I have ordered and independently interpreted X-rays - see prior notes.  I have ordered and independently interpreted EKG Reading(s) - see prior notes  Clinical Tests:   Lab Tests: Ordered and Reviewed  Radiological Study: Ordered and  "Reviewed  Medical Tests: Ordered and Reviewed  ED Management:  Pt reports improvement of pain with nitro.   Rapid covid negative.   I do, however, still have clinical suspicion that patient may have covid, given abnormal chest xray and chest CT.   "bilateral ground glass opacities most c/w atelectasis", no overt pneumonia.  No PE  Labs noted, wnl, wbc not elevated, first troponin negative.   Heart score 5. Pt needs to be placed in observation to further risk stratify and rule out acs.   Will need to stay on isolation since I cannot clinically rule out covid at this time.   Discussed plan w EDGARDO Miles with .         Additional MDM:   Heart Score:    History:          Moderately suspicious.  ECG:             Nonspecific repolarisation disturbance  Age:               45-65 years  Risk factors: >= 3 risk factors or history of atherosclerotic disease  Troponin:       Less than or equal to normal limit  Final Score: 5             Scribe Attestation:   Scribe #1: I performed the above scribed service and the documentation accurately describes the services I performed. I attest to the accuracy of the note.                          Clinical Impression:     1. Chest pain, unspecified type    2. Chest pain    3. Abnormal chest CT    4. Hypertension, unspecified type    5. Diabetes mellitus due to underlying condition with hyperglycemia, unspecified whether long term insulin use                ED Disposition Condition    Observation                           Tavo Hernadez MD  04/26/20 0951    "

## 2020-04-26 NOTE — ASSESSMENT & PLAN NOTE
-Lipid panel  -Continue home Pravachol (pravastatin), ASA  -Cardiac/Low cholesterol diet

## 2020-04-27 VITALS
SYSTOLIC BLOOD PRESSURE: 130 MMHG | HEART RATE: 62 BPM | HEIGHT: 66 IN | OXYGEN SATURATION: 96 % | WEIGHT: 220 LBS | BODY MASS INDEX: 35.36 KG/M2 | TEMPERATURE: 98 F | DIASTOLIC BLOOD PRESSURE: 79 MMHG | RESPIRATION RATE: 16 BRPM

## 2020-04-27 LAB
ALBUMIN SERPL BCP-MCNC: 3.1 G/DL (ref 3.5–5.2)
ALP SERPL-CCNC: 95 U/L (ref 55–135)
ALT SERPL W/O P-5'-P-CCNC: 8 U/L (ref 10–44)
ANION GAP SERPL CALC-SCNC: 9 MMOL/L (ref 8–16)
AORTIC ROOT ANNULUS: 3.24 CM
AORTIC VALVE CUSP SEPERATION: 2.07 CM
ASCENDING AORTA: 2.78 CM
AST SERPL-CCNC: 9 U/L (ref 10–40)
AV INDEX (PROSTH): 0.76
AV MEAN GRADIENT: 5 MMHG
AV PEAK GRADIENT: 9 MMHG
AV VALVE AREA: 2.28 CM2
AV VELOCITY RATIO: 0.68
BASOPHILS # BLD AUTO: 0.02 K/UL (ref 0–0.2)
BASOPHILS NFR BLD: 0.3 % (ref 0–1.9)
BILIRUB SERPL-MCNC: 0.4 MG/DL (ref 0.1–1)
BSA FOR ECHO PROCEDURE: 2.16 M2
BUN SERPL-MCNC: 13 MG/DL (ref 8–23)
CALCIUM SERPL-MCNC: 9.6 MG/DL (ref 8.7–10.5)
CHLORIDE SERPL-SCNC: 101 MMOL/L (ref 95–110)
CO2 SERPL-SCNC: 28 MMOL/L (ref 23–29)
CREAT SERPL-MCNC: 1.2 MG/DL (ref 0.5–1.4)
CV ECHO LV RWT: 0.46 CM
DIFFERENTIAL METHOD: ABNORMAL
DOP CALC AO PEAK VEL: 1.52 M/S
DOP CALC AO VTI: 28.99 CM
DOP CALC LVOT AREA: 3 CM2
DOP CALC LVOT DIAMETER: 1.96 CM
DOP CALC LVOT PEAK VEL: 1.04 M/S
DOP CALC LVOT STROKE VOLUME: 66.22 CM3
DOP CALCLVOT PEAK VEL VTI: 21.96 CM
E WAVE DECELERATION TIME: 349.6 MSEC
E/A RATIO: 0.8
E/E' RATIO: 14 M/S
ECHO LV POSTERIOR WALL: 1.05 CM (ref 0.6–1.1)
EOSINOPHIL # BLD AUTO: 0 K/UL (ref 0–0.5)
EOSINOPHIL NFR BLD: 0.5 % (ref 0–8)
ERYTHROCYTE [DISTWIDTH] IN BLOOD BY AUTOMATED COUNT: 15.1 % (ref 11.5–14.5)
EST. GFR  (AFRICAN AMERICAN): 56 ML/MIN/1.73 M^2
EST. GFR  (NON AFRICAN AMERICAN): 49 ML/MIN/1.73 M^2
FRACTIONAL SHORTENING: 28 % (ref 28–44)
GLUCOSE SERPL-MCNC: 320 MG/DL (ref 70–110)
HCT VFR BLD AUTO: 37 % (ref 37–48.5)
HGB BLD-MCNC: 11.1 G/DL (ref 12–16)
IMM GRANULOCYTES # BLD AUTO: 0.02 K/UL (ref 0–0.04)
IMM GRANULOCYTES NFR BLD AUTO: 0.3 % (ref 0–0.5)
INTERVENTRICULAR SEPTUM: 1.06 CM (ref 0.6–1.1)
IVRT: 131.49 MSEC
LA MAJOR: 5.74 CM
LA MINOR: 5.69 CM
LA WIDTH: 3.52 CM
LEFT ATRIUM SIZE: 2.2 CM
LEFT ATRIUM VOLUME INDEX: 18.1 ML/M2
LEFT ATRIUM VOLUME: 37.62 CM3
LEFT INTERNAL DIMENSION IN SYSTOLE: 3.33 CM (ref 2.1–4)
LEFT VENTRICLE DIASTOLIC VOLUME INDEX: 47.01 ML/M2
LEFT VENTRICLE DIASTOLIC VOLUME: 97.92 ML
LEFT VENTRICLE MASS INDEX: 82 G/M2
LEFT VENTRICLE SYSTOLIC VOLUME INDEX: 21.6 ML/M2
LEFT VENTRICLE SYSTOLIC VOLUME: 45.03 ML
LEFT VENTRICULAR INTERNAL DIMENSION IN DIASTOLE: 4.61 CM (ref 3.5–6)
LEFT VENTRICULAR MASS: 171.57 G
LV LATERAL E/E' RATIO: 12.6 M/S
LV SEPTAL E/E' RATIO: 15.75 M/S
LYMPHOCYTES # BLD AUTO: 2.9 K/UL (ref 1–4.8)
LYMPHOCYTES NFR BLD: 38.1 % (ref 18–48)
MCH RBC QN AUTO: 23 PG (ref 27–31)
MCHC RBC AUTO-ENTMCNC: 30 G/DL (ref 32–36)
MCV RBC AUTO: 77 FL (ref 82–98)
MONOCYTES # BLD AUTO: 0.7 K/UL (ref 0.3–1)
MONOCYTES NFR BLD: 9.7 % (ref 4–15)
MV PEAK A VEL: 0.79 M/S
MV PEAK E VEL: 0.63 M/S
NEUTROPHILS # BLD AUTO: 3.9 K/UL (ref 1.8–7.7)
NEUTROPHILS NFR BLD: 51.1 % (ref 38–73)
NRBC BLD-RTO: 0 /100 WBC
PISA TR MAX VEL: 1.12 M/S
PLATELET # BLD AUTO: 260 K/UL (ref 150–350)
PMV BLD AUTO: 10.9 FL (ref 9.2–12.9)
POCT GLUCOSE: 288 MG/DL (ref 70–110)
POCT GLUCOSE: 290 MG/DL (ref 70–110)
POTASSIUM SERPL-SCNC: 3.9 MMOL/L (ref 3.5–5.1)
PROT SERPL-MCNC: 6.7 G/DL (ref 6–8.4)
PULM VEIN S/D RATIO: 1.31
PV PEAK D VEL: 0.32 M/S
PV PEAK S VEL: 0.42 M/S
PV PEAK VELOCITY: 1.1 CM/S
RA MAJOR: 5.13 CM
RA PRESSURE: 3 MMHG
RA WIDTH: 3.5 CM
RBC # BLD AUTO: 4.83 M/UL (ref 4–5.4)
RIGHT VENTRICULAR END-DIASTOLIC DIMENSION: 2.81 CM
RV TISSUE DOPPLER FREE WALL SYSTOLIC VELOCITY 1 (APICAL 4 CHAMBER VIEW): 14.72 CM/S
SINUS: 2.86 CM
SODIUM SERPL-SCNC: 138 MMOL/L (ref 136–145)
STJ: 2.48 CM
TDI LATERAL: 0.05 M/S
TDI SEPTAL: 0.04 M/S
TDI: 0.05 M/S
TR MAX PG: 5 MMHG
TRICUSPID ANNULAR PLANE SYSTOLIC EXCURSION: 2.58 CM
TROPONIN I SERPL DL<=0.01 NG/ML-MCNC: 0.01 NG/ML (ref 0–0.03)
TROPONIN I SERPL DL<=0.01 NG/ML-MCNC: 0.03 NG/ML (ref 0–0.03)
TV REST PULMONARY ARTERY PRESSURE: 8 MMHG
WBC # BLD AUTO: 7.62 K/UL (ref 3.9–12.7)

## 2020-04-27 PROCEDURE — 80053 COMPREHEN METABOLIC PANEL: CPT

## 2020-04-27 PROCEDURE — C9113 INJ PANTOPRAZOLE SODIUM, VIA: HCPCS | Performed by: HOSPITALIST

## 2020-04-27 PROCEDURE — 96372 THER/PROPH/DIAG INJ SC/IM: CPT

## 2020-04-27 PROCEDURE — 25000003 PHARM REV CODE 250: Performed by: HOSPITALIST

## 2020-04-27 PROCEDURE — 63600175 PHARM REV CODE 636 W HCPCS: Performed by: HOSPITALIST

## 2020-04-27 PROCEDURE — 96376 TX/PRO/DX INJ SAME DRUG ADON: CPT | Mod: 59

## 2020-04-27 PROCEDURE — 84484 ASSAY OF TROPONIN QUANT: CPT

## 2020-04-27 PROCEDURE — 85025 COMPLETE CBC W/AUTO DIFF WBC: CPT

## 2020-04-27 PROCEDURE — 84484 ASSAY OF TROPONIN QUANT: CPT | Mod: 91

## 2020-04-27 PROCEDURE — 36415 COLL VENOUS BLD VENIPUNCTURE: CPT

## 2020-04-27 RX ORDER — ATORVASTATIN CALCIUM 40 MG/1
40 TABLET, FILM COATED ORAL NIGHTLY
Status: DISCONTINUED | OUTPATIENT
Start: 2020-04-27 | End: 2020-04-27 | Stop reason: HOSPADM

## 2020-04-27 RX ORDER — PANTOPRAZOLE SODIUM 40 MG/1
40 TABLET, DELAYED RELEASE ORAL DAILY
Qty: 45 TABLET | Refills: 0 | Status: SHIPPED | OUTPATIENT
Start: 2020-04-27

## 2020-04-27 RX ADMIN — ASPIRIN 81 MG CHEWABLE TABLET 81 MG: 81 TABLET CHEWABLE at 09:04

## 2020-04-27 RX ADMIN — LISINOPRIL 40 MG: 20 TABLET ORAL at 09:04

## 2020-04-27 RX ADMIN — CHLORTHALIDONE 25 MG: 25 TABLET ORAL at 09:04

## 2020-04-27 RX ADMIN — PRAVASTATIN SODIUM 80 MG: 40 TABLET ORAL at 09:04

## 2020-04-27 RX ADMIN — INSULIN ASPART 6 UNITS: 100 INJECTION, SOLUTION INTRAVENOUS; SUBCUTANEOUS at 10:04

## 2020-04-27 RX ADMIN — METOPROLOL SUCCINATE 100 MG: 50 TABLET, EXTENDED RELEASE ORAL at 09:04

## 2020-04-27 RX ADMIN — FERROUS SULFATE TAB EC 325 MG (65 MG FE EQUIVALENT) 325 MG: 325 (65 FE) TABLET DELAYED RESPONSE at 09:04

## 2020-04-27 RX ADMIN — PANTOPRAZOLE SODIUM 40 MG: 40 INJECTION, POWDER, FOR SOLUTION INTRAVENOUS at 09:04

## 2020-04-27 NOTE — H&P
"[facility] Hospital Medicine  History & Physical    Patient Name: Gala Alcaraz  MRN: [id number]  Admission Date: 4/26/2020  Attending Physician: Mayra Flores MD   Primary Care Provider: Justo Rico MD         Patient information was obtained from patient and ER records.     Subjective:     Principal Problem:Chest pain    Chief Complaint:   Chief Complaint   Patient presents with    Chest Pain     MIDSTERNAL NON RADIATING CHEST PAIN X 3 DAYS; PT STATES IS FEELS LIKE A PRESSURE; DENIES SOB AND N/V        HPI: Gala Alcaraz 59 y.o. female PMHx: Hypertension, Diabetes mellitus,HLD, and anemia  presenting with complaints of chest pain x 1 week. Reports continuous pounding midsternal chest pain," feels like someone is hitting me in the chest", non-radiating,and no contributing/alleviating factors. Denies SOB,NVD, focal deficits, numbness or tingling in any extremity, leg or calve pain. Denies ETOH,smoking or illegal substances use.    In ED hypertensive,bradycardia, labs reveals elevated levels of BNP,LD, D-dimer and  (-) COVID. EKG- no acute ischemia,CTA chest- no pulmonary emboli although there is bilateral lower lobe ground-glass findings most suggestive of atelectasis. Received ASA and sublingual Nitroglycerin,chest pain improved,not resolved. Placed in observation to Hospital Medicine for further evaluation and treatment.            Past Medical History:   Diagnosis Date    Anemia     Diabetes mellitus     Hyperlipidemia     Hypertension        Past Surgical History:   Procedure Laterality Date    HYSTERECTOMY         Review of patient's allergies indicates:  No Known Allergies    No current facility-administered medications on file prior to encounter.      Current Outpatient Medications on File Prior to Encounter   Medication Sig    chlorthalidone (HYGROTEN) 25 MG Tab Take 25 mg by mouth once daily.    ferrous sulfate 325 mg (65 mg iron) Tab tablet Take 325 mg by mouth daily with " breakfast.    glimepiride (AMARYL) 4 MG tablet Take 4 mg by mouth before breakfast.    lisinopril (PRINIVIL,ZESTRIL) 40 MG tablet Take 40 mg by mouth once daily.    meloxicam (MOBIC) 15 MG tablet Take 1 tablet (15 mg total) by mouth once daily.    metoprolol succinate (TOPROL-XL) 50 MG 24 hr tablet Take 50 mg by mouth once daily.    potassium chloride (MICRO-K) 10 MEQ CpSR Take 10 mEq by mouth once.    pravastatin (PRAVACHOL) 80 MG tablet Take 80 mg by mouth once daily.    amLODIPine (NORVASC) 10 MG tablet Take 10 mg by mouth once daily.    diazePAM (VALIUM) 10 MG Tab Take 1 tablet (10 mg total) by mouth every 8 (eight) hours as needed (muscle spasm).    metFORMIN (GLUCOPHAGE) 500 MG tablet Take 500 mg by mouth 2 (two) times daily with meals.    SITagliptin (JANUVIA) 50 MG Tab Take 50 mg by mouth once daily.     Family History     None        Tobacco Use    Smoking status: Never Smoker    Smokeless tobacco: Never Used   Substance and Sexual Activity    Alcohol use: No    Drug use: Not on file    Sexual activity: Not on file     Review of Systems   Constitutional: Negative for appetite change, fatigue and fever.   HENT: Negative for congestion, ear pain, postnasal drip, rhinorrhea, sneezing and sore throat.    Eyes: Negative for photophobia and visual disturbance.   Respiratory: Negative for cough, shortness of breath and wheezing.    Cardiovascular: Positive for chest pain. Negative for leg swelling.   Gastrointestinal: Negative for abdominal distention, abdominal pain, constipation, diarrhea, nausea and vomiting.   Genitourinary: Negative for dysuria, enuresis, frequency, hematuria and urgency.   Musculoskeletal: Negative for back pain and joint swelling.   Skin: Negative for color change.   Neurological: Negative for syncope, weakness, light-headedness, numbness and headaches.   Hematological: Does not bruise/bleed easily.   Psychiatric/Behavioral: Negative for agitation and confusion.      Objective:     Vital Signs (Most Recent):  Temp: 98.2 °F (36.8 °C) (04/26/20 1333)  Pulse: (!) 58 (04/26/20 1453)  Resp: 20 (04/26/20 1333)  BP: (!) 169/90 (04/26/20 1453)  SpO2: 98 % (04/26/20 1453) Vital Signs (24h Range):  Temp:  [98.2 °F (36.8 °C)] 98.2 °F (36.8 °C)  Pulse:  [58-68] 58  Resp:  [18-20] 20  SpO2:  [98 %-100 %] 98 %  BP: (131-222)/() 169/90     Weight: 99.8 kg (220 lb)  Body mass index is 34.98 kg/m².    Physical Exam   Constitutional: She is oriented to person, place, and time. She appears well-developed and well-nourished.   HENT:   Head: Normocephalic and atraumatic.   Mouth/Throat: Oropharynx is clear and moist.   Eyes: Conjunctivae are normal.   Neck: Normal range of motion. Neck supple.   Cardiovascular: Normal rate, regular rhythm, normal heart sounds and intact distal pulses.   No murmur heard.  Pulmonary/Chest: Effort normal and breath sounds normal. She has no wheezes. She has no rales.   Abdominal: Soft. Bowel sounds are normal. She exhibits no distension. There is no tenderness. There is no rebound.   Musculoskeletal: Normal range of motion. She exhibits no edema.   Neurological: She is alert and oriented to person, place, and time. She has normal strength. GCS eye subscore is 4. GCS verbal subscore is 5. GCS motor subscore is 6.   Skin: Skin is warm and dry. Capillary refill takes less than 2 seconds.   Psychiatric: She has a normal mood and affect. Her behavior is normal. Thought content normal.   Nursing note and vitals reviewed.          Significant Labs:   CBC: No results for input(s): WBC, HGB, HCT, PLT in the last 48 hours.  CMP: No results for input(s): NA, K, CL, CO2, GLU, BUN, CREATININE, CALCIUM, PROT, ALBUMIN, BILITOT, ALKPHOS, AST, ALT, ANIONGAP, EGFRNONAA in the last 48 hours.    Invalid input(s): ESTGFAFRICA  Cardiac Markers: No results for input(s): CKMB, MYOGLOBIN, BNP, TROPISTAT in the last 48 hours.  POCT Glucose:   Recent Labs   Lab 04/26/20  1231    POCTGLUCOSE 348*     Troponin: No results for input(s): TROPONINI in the last 48 hours.  All pertinent labs within the past 24 hours have been reviewed.    Significant Imaging: I have reviewed all pertinent imaging results/findings within the past 24 hours.    Assessment/Plan:     * Chest pain  Patient currently with  chest pain.  HEART score of 5, serial cardiac markers, which thus far have been negative. EKG reveals no acute ischemia;Chest X-ray reveals bibasilar hazy opacification;CTA chest- bilateral lower lobe ground-glass findings most suggestive of atelectasis.  -Telemetry  -Vitals  -Serial cardiac enzymes   -ASA  -Nitro SL prn  -Supplemental oxygen at 2L NC PRN SOB  -2D Echo  -UA,TSH, lipid panel    Obesity (BMI 30-39.9)  The patient has been counseled on the negative impact that obesity imparts on her health and was encouraged to make lifestyle changes in order to lose weight and decrease her modifiable risk factors.  - ADA/Cardiac Diet  -Nursing Education and reference material (pamphlets)      Anemia  Stable at patients baseline.  -CBC    HLD (hyperlipidemia)  -Lipid panel  -Continue home Pravachol (pravastatin), ASA  -Cardiac/Low cholesterol diet    Hypertension  Poorly Controlled,systolic blood pressure 131-222's  -Vitals  -BMP  -Continue home Lisinopril,Metoprolol tartrate,Chlorthalidone and Pravastatin      DM (diabetes mellitus)  Poorly-controlled,Random glucose 358 today. Last PecR0t-1.3 on 03/04/20  -ACHS POCT glucose monitoring  -ADA diet  -Basal and Correctional scale insulin,adjust as warranted.          VTE Risk Mitigation (From admission, onward)         Ordered     enoxaparin injection 40 mg  Daily      04/26/20 1955     IP VTE HIGH RISK PATIENT  Once      04/26/20 1953     Place sequential compression device  Until discontinued      04/26/20 1953                   Nav Williamson, APRN, FNP-C  Hospitalist - Department of Hospital Medicine  [facility] - [address],  [place] [address]  Office [phone]; Pager [phone]

## 2020-04-27 NOTE — PLAN OF CARE
04/27/20 1343   Final Note   Assessment Type Final Discharge Note   Anticipated Discharge Disposition Home   Hospital Follow Up  Appt(s) scheduled? Yes   Discharge plans and expectations educations in teach back method with documentation complete? Yes   Right Care Referral Info   Post Acute Recommendation No Care   Post-Acute Status   Post-Acute Authorization Other   Other Status No Post-Acute Service Needs   pts nurse James notified that the pt can d/c from CM standpoint.

## 2020-04-27 NOTE — PROGRESS NOTES
"Discharge Instructions for High Blood Pressure (Hypertension)  You have been diagnosed with high blood pressure (also called hypertension). This means the force of blood against your artery walls is too strong. It also means your heart is working hard to move blood. High blood pressure usually has no symptoms, but over time, it can damage your heart, blood vessels, eyes, kidneys, and other organs. With help from your doctor, you can manage your blood pressure and protect your health.  Taking medicine  · Learn to take your own blood pressure. Keep a record of your results. Ask your doctor which readings mean that you need medical attention.  · Take your blood pressure medicine exactly as directed. Dont skip doses. Missing doses can cause your blood pressure to get out of control.  · If you do miss a dose (or doses) check with your healthcare provider about what to do.  · Avoid medicine that contain heart stimulants, including over-the-counter drugs. Check for warnings about high blood pressure on the label. Ask the pharmacist before purchasing something you haven't used before  · Check with your doctor or pharmacist before taking a decongestant. Some decongestants can worsen high blood pressure.  Lifestyle changes  · Maintain a healthy weight. Get help to lose any extra pounds.  · Cut back on salt.  ? Limit canned, dried, packaged, and fast foods.  ? Dont add salt to your food at the table.  ? Season foods with herbs instead of salt when you cook.  ? Request no added salt when you go to a restaurant.  ? The American Heart Associations (AHA) "ideal" sodium intake recommendation is 1,500 milligrams per day.  However, since American's eat so much salt, the AHA says a positive change can occur by cutting back to even 2,400 milligrams of sodium a day.   · Follow the DASH (Dietary Approaches to Stop Hypertension) eating plan. This plan recommends vegetables, fruits, whole gains, and other heart healthy foods.  · Begin " an exercise program. Ask your doctor how to get started. The American Heart Association recommends aerobic exercise 3 to 4 times a week for an average of 40 minutes at a time, with your doctor's approval. Simple activities like walking or gardening can help.  · Break the smoking habit. Enroll in a stop-smoking program to improve your chances of success. Ask your healthcare provider about programs and medicines to help you stop smoking.  · Limit drinks that contain caffeine (coffee, black or green tea, cola) to 2 per day.  · Never take stimulants such as amphetamines or cocaine; these drugs can be deadly for someone with high blood pressure.  · Control your stress. Learn stress-management techniques.  · Limit alcohol to no more than 1 drink a day for women and 2 drinks a day for men.  Follow-up care  Make a follow-up appointment as directed by our staff.     When to seek medical care  Call your doctor immediately or seek emergency care if you have any of the following:  · Chest pain or shortness of breath (call 911)  · Moderate to severe headache  · Weakness in the muscles of your face, arms, or legs  · Trouble speaking  · Extreme drowsiness  · Confusion  · Fainting or dizziness  · Pulsating or rushing sound in your ears  · Unexplained nosebleed  · Weakness, tingling, or numbness of your face, arms, or legs  · Change in vision  ·

## 2020-04-27 NOTE — DISCHARGE SUMMARY
"[facility] [place], [place] Medicine  Discharge Summary      Patient Name: Gala Alcaraz  MRN: [id number]  Admission Date: 4/26/2020  Hospital Length of Stay: 0 days  Discharge Date and Time:  04/27/2020 11:07 AM  Attending Physician: Mayra Flores MD   Discharging Provider: NITHYA Razo  Primary Care Provider: Justo Rico MD      HPI:   Gala Alcaraz 59 y.o. female PMHx: Hypertension, Diabetes mellitus,HLD, and anemia  presenting with complaints of chest pain x 1 week. Reports continuous pounding midsternal chest pain," feels like someone is hitting me in the chest", non-radiating,and no contributing/alleviating factors. Denies SOB,NVD, focal deficits, numbness or tingling in any extremity, leg or calve pain. Denies ETOH,smoking or illegal substances use.    In ED hypertensive,bradycardia, labs reveals elevated levels of BNP,LD, D-dimer and  (-) COVID. EKG- no acute ischemia,CTA chest- no pulmonary emboli although there is bilateral lower lobe ground-glass findings most suggestive of atelectasis. Received ASA and sublingual Nitroglycerin,chest pain improved,not resolved. Placed in observation to Hospital Medicine for further evaluation and treatment.            * No surgery found *      Hospital Course:   Placed in observation for evaluation of atypical chest pain. Her ELVA score = 1. Noted to have grossly elevated bp at the time of presentation = 222/116. Her symptoms resolved with restarting her home medications. She is also noted on home medications to be taking diazepam 10 mg prn which suggests a component of anxiety related to her poorly controlled bp. Additionally, cannot exclude GI for her mid-sternal/epigastric pain. She was noted in chart review to have recently been seen at [facility] last month - she had CT Abdomen Pelvis with Contrast which showed a fat-containing umbilical hernia and possible reflux, "(Final result) Result time 03/04/20  Impression:   1. Minimal fullness of both " ureters down to the bladder, nonspecific and likely physiologic. This could reflect chronic reflux as well.  2. Very small fat-containing umbilical hernia without bowel involvement.  3. Mild ascending and transverse colonic constipation.    She is stable at this time and will discharge to home - Her BP normalized with restarting her home medications. She will be referred to cardiology for outpatient follow up and workup as warranted. Chest pain free, EKG non-ischemic. Normal initial troponin that trended up on second mildly to 0.28 thought to be directly related to demand as her BP was 222/116 at the time of presentation. Troponin trended to normal ruled out ACS - BP elevation Resolved with restarting home meds and diazepam. Also referral to GI to evaluate the hernia and likely reflux disease. PPI trial.         Consults:   Consults (From admission, onward)        Status Ordering Provider     Inpatient consult to Social Work/Case Management  Once     Provider:  (Not yet assigned)    Acknowledged CHLOE BADILLO          No new Assessment & Plan notes have been filed under this hospital service since the last note was generated.  Service: Hospital Medicine    Final Active Diagnoses:    Diagnosis Date Noted POA    PRINCIPAL PROBLEM:  Chest pain [R07.9] 04/26/2020 Yes    DM (diabetes mellitus) [E11.9] 04/26/2020 Yes     Chronic    Hypertension [I10] 04/26/2020 Yes     Chronic    HLD (hyperlipidemia) [E78.5] 04/26/2020 Yes     Chronic    Anemia [D64.9] 04/26/2020 Yes     Chronic    Obesity (BMI 30-39.9) [E66.9] 04/26/2020 Yes     Chronic      Problems Resolved During this Admission:       Discharged Condition: stable    Disposition: Home or Self Care    Follow Up:  Follow-up Information     Justo Rico MD In 2 weeks.    Specialty:  General Practice  Why:  Call the office to schedule appointment  Contact information:  [address] [place] [address] [phone]             Antwan Valencia MD.     Specialty:  Cardiology  Why:  outpatient cardiac follow up and workup as warranted  Contact information:  [address]  [address]  [place] [id number] [phone]             Leopoldo Lau MD In 1 week.    Specialty:  Gastroenterology  Why:  Call the office to schedule appointment, For outpatient follow-up/post hospitalizaton  Contact information:  [address] [place], [place] [address]  [address]  [facility]  [place] [address] [phone]                 Patient Instructions:      Ambulatory referral/consult to Gastroenterology   Standing Status: Future   Referral Priority: Routine Referral Type: Consultation   Referral Reason: Specialty Services Required   Requested Specialty: Gastroenterology   Number of Visits Requested: 1     Diet Cardiac     Diet diabetic     Activity as tolerated       Significant Diagnostic Studies: Labs:   CMP   Recent Labs   Lab 04/27/20  0311      K 3.9      CO2 28   *   BUN 13   CREATININE 1.2   CALCIUM 9.6   PROT 6.7   ALBUMIN 3.1*   BILITOT 0.4   ALKPHOS 95   AST 9*   ALT 8*   ANIONGAP 9   ESTGFRAFRICA 56*   EGFRNONAA 49*   , CBC   Recent Labs   Lab 04/27/20  0311   WBC 7.62   HGB 11.1*   HCT 37.0      , INR No results found for: INR, PROTIME, Lipid Panel   Lab Results   Component Value Date    CHOL 180 04/26/2020    HDL 48 04/26/2020    LDLCALC 95.8 04/26/2020    TRIG 181 (H) 04/26/2020    CHOLHDL 26.7 04/26/2020   , Troponin   Recent Labs   Lab 04/27/20  0956   TROPONINI 0.010    and A1C: No results for input(s): HGBA1C in the last 4320 hours.     Medications:  Reconciled Home Medications:      Medication List      START taking these medications    pantoprazole 40 MG tablet  Commonly known as:  PROTONIX  Take 1 tablet (40 mg total) by mouth once daily.        CONTINUE taking these medications    chlorthalidone 25 MG Tab  Commonly known as:  HYGROTEN  Take 25 mg by mouth once daily.     diazePAM 10 MG Tab  Commonly known as:   VALIUM  Take 1 tablet (10 mg total) by mouth every 8 (eight) hours as needed (muscle spasm).     ferrous sulfate 325 mg (65 mg iron) Tab tablet  Commonly known as:  FEOSOL  Take 325 mg by mouth daily with breakfast.     insulin aspart U-100 100 unit/mL injection  Commonly known as:  NOVOLOG  Inject 8 Units into the skin 3 (three) times daily before meals.     LANTUS SOLOSTAR U-100 INSULIN glargine 100 units/mL (3mL) SubQ pen  Generic drug:  insulin  Inject 12 Units into the skin every evening.     lisinopriL 40 MG tablet  Commonly known as:  PRINIVIL,ZESTRIL  Take 40 mg by mouth once daily.     meloxicam 15 MG tablet  Commonly known as:  MOBIC  Take 1 tablet (15 mg total) by mouth once daily.     metoprolol succinate 50 MG 24 hr tablet  Commonly known as:  TOPROL-XL  Take 100 mg by mouth once daily.     potassium chloride 10 MEQ Cpsr  Commonly known as:  MICRO-K  Take 10 mEq by mouth once.     pravastatin 80 MG tablet  Commonly known as:  PRAVACHOL  Take 80 mg by mouth once daily.        STOP taking these medications    amLODIPine 10 MG tablet  Commonly known as:  NORVASC     metFORMIN 500 MG tablet  Commonly known as:  GLUCOPHAGE            Indwelling Lines/Drains at time of discharge:   Lines/Drains/Airways     None                 Time spent on the discharge of patient: 35 minutes  Patient was seen and examined on the date of discharge and determined to be suitable for discharge.       JOSÉ MIGUEL Johnson, FNP-C  Hospitalist - Department of Hospital Medicine  [address], [place] [address]  Office [phone]; Pager [phone]

## 2020-04-27 NOTE — PLAN OF CARE
04/27/20 1020   Discharge Assessment   Assessment Type Discharge Planning Assessment   Assessment information obtained from? Medical Record   Prior to hospitilization cognitive status: Alert/Oriented   Prior to hospitalization functional status: Independent   Current cognitive status: Alert/Oriented   Current Functional Status: Independent   Facility Arrived From: home   Lives With other (see comments)   Able to Return to Prior Arrangements yes   Is patient able to care for self after discharge? Yes   Who are your caregiver(s) and their phone number(s)? Chiquita- [phone]   Patient's perception of discharge disposition home or selfcare   Readmission Within the Last 30 Days no previous admission in last 30 days   Patient currently being followed by outpatient case management? No   Patient currently receives any other outside agency services? No   Equipment Currently Used at Home none   Do you have any problems affording any of your prescribed medications? No   Is the patient taking medications as prescribed? yes   Does the patient have transportation home? Yes   Transportation Anticipated family or friend will provide   Does the patient receive services at the Coumadin Clinic? No   Discharge Plan A Home with family  (with follow up )   DME Needed Upon Discharge  none   Patient/Family in Agreement with Plan yes

## 2020-04-27 NOTE — PROGRESS NOTES
WRITTEN HEALTHCARE DISCHARGE INFORMATION      Things that YOU are RESPONSIBLE for to Manage Your Care At Home:     1. Getting your prescriptions filled.  2. Taking you medications as directed. DO NOT MISS ANY DOSES!  3. Going to your follow-up doctor appointments. This is important because it allows the doctor to monitor your progress and to determine if any changes need to be made to your treatment plan.     If you are unable to make your follow up appointments, please call the number listed and reschedule this appointment.      ____________HELP AT HOME____________________     Experiencing any SIGNS or SYMPTOMS: YOU CAN     Schedule a same day appopintment with your Primary Care Doctor or  you can call [facility] On Call [facility] Care Line for 24/7 assistance at [phone]     If you are experience any signs or symptoms that have become severe, Call 911 and come to your nearest Emergency Room.     Thank you for choosing [facility] and allowing us to care for you.   From your care management team:      You should receive a call from [facility] within 48-72 hours to help manage your care after discharge. Please try to make sure that you answer your phone for this important phone call.    Follow-up Information     Justo Rico MD In 2 weeks.    Specialty:  General Practice  Why:  Call the office to schedule appointment  Contact information:  [address]  [place] [address]  [phone]             César Aggarwal MD On 5/25/2020.    Specialties:  Cardiology, INTERVENTIONAL CARDIOLOGY  Why:  outpatient cardiac follow up and workup as warranted-- appointment scheduled for May 25th at 9am  Contact information:  [address]  [address]  [place] [address]  [phone]             Leopoldo Lau MD In 2 weeks.    Specialty:  Gastroenterology  Why:  Call the office to schedule appointment, For outpatient follow-up/post hospitalizaton  Contact information:  [address] [place] [address]  [address]  [facility]  [place] [address]  [phone]

## 2020-04-27 NOTE — DISCHARGE INSTRUCTIONS
Referral to Gastroenterology for GERD  Referral to Cardiology poorly controlled BP and chest pain    Complete medications as ordered  Follow all discharge instructions.  Please schedule follow up appointments as necessary      When to Call Your Doctor  Call your doctor immediately if you have any of the following:  · Severe headache  · Severe dizziness, or fainting  · Nausea or vomiting  · Fast heartbeat (higher than 100 beats per minute)  · Fever or chills  · Swollen ankles  · Weakness  · Chest Pain attacks that last longer, occur more often, or are more severe than in the past

## 2020-04-27 NOTE — HOSPITAL COURSE
"Placed in observation for evaluation of atypical chest pain. Her ELVA score = 1. Noted to have grossly elevated bp at the time of presentation = 222/116. Her symptoms resolved with restarting her home medications. She is also noted on home medications to be taking diazepam 10 mg prn which suggests a component of anxiety related to her poorly controlled bp. Additionally, cannot exclude GI for her mid-sternal/epigastric pain. She was noted in chart review to have recently been seen at [facility] last month - she had CT Abdomen Pelvis with Contrast which showed a fat-containing umbilical hernia and possible reflux, "(Final result) Result time 03/04/20  Impression:   1. Minimal fullness of both ureters down to the bladder, nonspecific and likely physiologic. This could reflect chronic reflux as well.  2. Very small fat-containing umbilical hernia without bowel involvement.  3. Mild ascending and transverse colonic constipation.    She is stable at this time and will discharge to home - Her BP normalized with restarting her home medications. She will be referred to cardiology for outpatient follow up and workup as warranted. Chest pain free, EKG non-ischemic. Normal initial troponin that trended up on second mildly to 0.28 thought to be directly related to demand as her BP was 222/116 at the time of presentation. Troponin trended to normal ruled out ACS - BP elevation Resolved with restarting home meds and diazepam. Also referral to GI to evaluate the hernia and likely reflux disease. PPI trial.      "

## 2020-05-25 ENCOUNTER — OFFICE VISIT (OUTPATIENT)
Dept: CARDIOLOGY | Facility: CLINIC | Age: 60
End: 2020-05-25
Payer: MEDICAID

## 2020-05-25 VITALS
SYSTOLIC BLOOD PRESSURE: 118 MMHG | RESPIRATION RATE: 16 BRPM | HEART RATE: 68 BPM | WEIGHT: 215.19 LBS | DIASTOLIC BLOOD PRESSURE: 76 MMHG | BODY MASS INDEX: 34.73 KG/M2 | OXYGEN SATURATION: 99 %

## 2020-05-25 DIAGNOSIS — R07.9 CHEST PAIN, UNSPECIFIED TYPE: Primary | ICD-10-CM

## 2020-05-25 PROCEDURE — 99999 PR PBB SHADOW E&M-EST. PATIENT-LVL III: CPT | Mod: PBBFAC,,, | Performed by: INTERNAL MEDICINE

## 2020-05-25 PROCEDURE — 99204 PR OFFICE/OUTPT VISIT, NEW, LEVL IV, 45-59 MIN: ICD-10-PCS | Mod: S$PBB,,, | Performed by: INTERNAL MEDICINE

## 2020-05-25 PROCEDURE — 99213 OFFICE O/P EST LOW 20 MIN: CPT | Mod: PBBFAC | Performed by: INTERNAL MEDICINE

## 2020-05-25 PROCEDURE — 99999 PR PBB SHADOW E&M-EST. PATIENT-LVL III: ICD-10-PCS | Mod: PBBFAC,,, | Performed by: INTERNAL MEDICINE

## 2020-05-25 PROCEDURE — 99204 OFFICE O/P NEW MOD 45 MIN: CPT | Mod: S$PBB,,, | Performed by: INTERNAL MEDICINE

## 2020-05-25 NOTE — PROGRESS NOTES
CARDIOVASCULAR CONSULTATION    REASON FOR CONSULT:   Gala Alcaraz is a 59 y.o. female who presents for evaluation of chest pain.      HISTORY OF PRESENT ILLNESS:     Patient is a pleasant 59-year-old lady.  With a past medical history significant for recent admission for chest tightness.  Other past medical history includes diabetes, dyslipidemia and hypertension.  She was having substernal chest pressure.  No aggravating or relieving factors.  Denies any orthopnea, PND.  Could occur at rest or on exertion.  An echocardiogram was done  which showed normal left ventricular systolic function.    · Normal left ventricular systolic function. The estimated ejection fraction is 55%.  · Concentric left ventricular remodeling.  · No wall motion abnormalities.  · Normal right ventricular systolic function        PAST MEDICAL HISTORY:     Past Medical History:   Diagnosis Date    Anemia     Diabetes mellitus     Hyperlipidemia     Hypertension        PAST SURGICAL HISTORY:     Past Surgical History:   Procedure Laterality Date    HYSTERECTOMY         ALLERGIES AND MEDICATION:   Review of patient's allergies indicates:  No Known Allergies     Medication List           Accurate as of May 25, 2020  9:46 AM. If you have any questions, ask your nurse or doctor.               CONTINUE taking these medications    chlorthalidone 25 MG Tab  Commonly known as:  HYGROTEN     diazePAM 10 MG Tab  Commonly known as:  VALIUM  Take 1 tablet (10 mg total) by mouth every 8 (eight) hours as needed (muscle spasm).     ferrous sulfate 325 mg (65 mg iron) Tab tablet  Commonly known as:  FEOSOL     insulin aspart U-100 100 unit/mL injection  Commonly known as:  NOVOLOG     LANTUS SOLOSTAR U-100 INSULIN glargine 100 units/mL (3mL) SubQ pen  Generic drug:  insulin     lisinopriL 40 MG tablet  Commonly known as:  PRINIVIL,ZESTRIL     meloxicam 15 MG tablet  Commonly known as:  MOBIC  Take 1 tablet (15 mg total) by mouth once daily.      metoprolol succinate 50 MG 24 hr tablet  Commonly known as:  TOPROL-XL     pantoprazole 40 MG tablet  Commonly known as:  PROTONIX  Take 1 tablet (40 mg total) by mouth once daily.     potassium chloride 10 MEQ Cpsr  Commonly known as:  MICRO-K     pravastatin 80 MG tablet  Commonly known as:  PRAVACHOL            SOCIAL HISTORY:     Social History     Socioeconomic History    Marital status: Single     Spouse name: Not on file    Number of children: Not on file    Years of education: Not on file    Highest education level: Not on file   Occupational History    Not on file   Social Needs    Financial resource strain: Not on file    Food insecurity:     Worry: Not on file     Inability: Not on file    Transportation needs:     Medical: Not on file     Non-medical: Not on file   Tobacco Use    Smoking status: Never Smoker    Smokeless tobacco: Never Used   Substance and Sexual Activity    Alcohol use: No    Drug use: Not on file    Sexual activity: Not on file   Lifestyle    Physical activity:     Days per week: Not on file     Minutes per session: Not on file    Stress: Not on file   Relationships    Social connections:     Talks on phone: Not on file     Gets together: Not on file     Attends Restoration service: Not on file     Active member of club or organization: Not on file     Attends meetings of clubs or organizations: Not on file     Relationship status: Not on file   Other Topics Concern    Not on file   Social History Narrative    Not on file       FAMILY HISTORY:   History reviewed. No pertinent family history.    REVIEW OF SYSTEMS:   Review of Systems   Constitution: Negative.   HENT: Negative.    Eyes: Negative.    Cardiovascular: Positive for chest pain.   Respiratory: Negative.    Endocrine: Negative.    Hematologic/Lymphatic: Negative.    Skin: Negative.    Musculoskeletal: Negative.    Gastrointestinal: Negative.    Genitourinary: Negative.    Neurological: Negative.     Psychiatric/Behavioral: Negative.    Allergic/Immunologic: Negative.        A 10 point review of systems was performed and all the pertinent positives have been mentioned. Rest of review of systems was negative.        PHYSICAL EXAM:     Vitals:    05/25/20 0912   BP: 118/76   Pulse: 68   Resp: 16    Body mass index is 34.73 kg/m².  Weight: 97.6 kg (215 lb 2.7 oz)         Physical Exam   Constitutional: She is oriented to person, place, and time. She appears well-developed and well-nourished.   HENT:   Head: Normocephalic.   Eyes: Pupils are equal, round, and reactive to light.   Neck: Normal range of motion. Neck supple.   Cardiovascular: Normal rate and regular rhythm.   Pulmonary/Chest: Effort normal and breath sounds normal.   Abdominal: Soft. Normal appearance and bowel sounds are normal. There is no tenderness.   Musculoskeletal: Normal range of motion.   Neurological: She is alert and oriented to person, place, and time.   Skin: Skin is warm.   Psychiatric: She has a normal mood and affect.   Nursing note and vitals reviewed.        DATA:     Laboratory:  CBC:  Recent Labs   Lab 04/27/20  0311   WBC 7.62   Hemoglobin 11.1 L   Hematocrit 37.0   Platelets 260       CHEMISTRIES:  Recent Labs   Lab 04/27/20  0311   Glucose 320 H   Sodium 138   Potassium 3.9   BUN, Bld 13   Creatinine 1.2   eGFR if  56 A   eGFR if non African American 49 A   Calcium 9.6       CARDIAC BIOMARKERS:  Recent Labs   Lab 04/26/20 2046 04/27/20  0311 04/27/20  0956   Troponin I 0.025 0.028 H 0.010       COAGS:        LIPIDS/LFTS:  Recent Labs   Lab 04/26/20 2046 04/27/20  0311   Cholesterol 180  --    Triglycerides 181 H  --    HDL 48  --    LDL Cholesterol 95.8  --    Non-HDL Cholesterol 132  --    AST  --  9 L   ALT  --  8 L       Hemoglobin A1C   Date Value Ref Range Status   03/04/2020 9.3 (H) 4.0 - 5.6 % Final       TSH  Recent Labs   Lab 04/26/20 2046   TSH 0.491       The 10-year ASCVD risk score (Philip SCHULZ Jr.,  et al., 2013) is: 13%    Values used to calculate the score:      Age: 59 years      Sex: Female      Is Non- : Yes      Diabetic: Yes      Tobacco smoker: No      Systolic Blood Pressure: 118 mmHg      Is BP treated: Yes      HDL Cholesterol: 48 mg/dL      Total Cholesterol: 180 mg/dL             ASSESSMENT AND PLAN     Patient Active Problem List   Diagnosis    Chest pain    DM (diabetes mellitus)    Hypertension    HLD (hyperlipidemia)    Anemia    Obesity (BMI 30-39.9)       Chest pressure in a patient with multiple risk factors for coronary artery disease.  Further evaluation with the help of a stress test.  Patient states he will not be able to run on a treadmill because of arthritis and hence we will do pharmacological stress test.          Thank you very much for involving me in the care of your patient.  Please do not hesitate to contact me if there are any questions.      César Aggarwal MD, FACC, [facility]  Interventional Cardiologist, [facility].           This note was dictated with the help of speech recognition software.  There might be un-intended errors and/or substitutions.

## 2020-07-10 ENCOUNTER — OFFICE VISIT (OUTPATIENT)
Dept: CARDIOLOGY | Facility: CLINIC | Age: 60
End: 2020-07-10
Payer: MEDICAID

## 2020-07-10 VITALS
WEIGHT: 217.81 LBS | DIASTOLIC BLOOD PRESSURE: 72 MMHG | RESPIRATION RATE: 16 BRPM | BODY MASS INDEX: 35.16 KG/M2 | HEART RATE: 67 BPM | OXYGEN SATURATION: 96 % | SYSTOLIC BLOOD PRESSURE: 122 MMHG

## 2020-07-10 DIAGNOSIS — E66.01 MORBID OBESITY: ICD-10-CM

## 2020-07-10 DIAGNOSIS — I10 ESSENTIAL HYPERTENSION: ICD-10-CM

## 2020-07-10 DIAGNOSIS — E78.5 DYSLIPIDEMIA: ICD-10-CM

## 2020-07-10 DIAGNOSIS — E11.69 TYPE 2 DIABETES MELLITUS WITH OTHER SPECIFIED COMPLICATION, UNSPECIFIED WHETHER LONG TERM INSULIN USE: ICD-10-CM

## 2020-07-10 DIAGNOSIS — R07.9 CHEST PAIN, UNSPECIFIED TYPE: Primary | ICD-10-CM

## 2020-07-10 DIAGNOSIS — E78.5 HYPERLIPIDEMIA, UNSPECIFIED HYPERLIPIDEMIA TYPE: ICD-10-CM

## 2020-07-10 PROCEDURE — 99214 PR OFFICE/OUTPT VISIT, EST, LEVL IV, 30-39 MIN: ICD-10-PCS | Mod: S$PBB,,, | Performed by: INTERNAL MEDICINE

## 2020-07-10 PROCEDURE — 99999 PR PBB SHADOW E&M-EST. PATIENT-LVL IV: CPT | Mod: PBBFAC,,, | Performed by: INTERNAL MEDICINE

## 2020-07-10 PROCEDURE — 99999 PR PBB SHADOW E&M-EST. PATIENT-LVL IV: ICD-10-PCS | Mod: PBBFAC,,, | Performed by: INTERNAL MEDICINE

## 2020-07-10 PROCEDURE — 99214 OFFICE O/P EST MOD 30 MIN: CPT | Mod: S$PBB,,, | Performed by: INTERNAL MEDICINE

## 2020-07-10 PROCEDURE — 99214 OFFICE O/P EST MOD 30 MIN: CPT | Mod: PBBFAC,PO | Performed by: INTERNAL MEDICINE

## 2020-07-10 NOTE — PROGRESS NOTES
CARDIOVASCULAR CONSULTATION    REASON FOR CONSULT:   Gala Alcaraz is a 59 y.o. female who presents for evaluation of chest pain.      HISTORY OF PRESENT ILLNESS:     Patient is a pleasant 59-year-old lady.  With a past medical history significant for recent admission for chest tightness.  Other past medical history includes diabetes, dyslipidemia and hypertension.  She was having substernal chest pressure.  No aggravating or relieving factors.  Denies any orthopnea, PND.  Could occur at rest or on exertion.  An echocardiogram was done  which showed normal left ventricular systolic function.    · Normal left ventricular systolic function. The estimated ejection fraction is 55%.  · Concentric left ventricular remodeling.  · No wall motion abnormalities.  · Normal right ventricular systolic function    Notes from July 2020:  Patient here for follow-up.  He has on and off chest pressure.  Substernal in nature, associated with shortness of breath.  On going up 1 flight of stairs.  No radiation.  EKG abnormal.  Shows sinus bradycardia with T-wave abnormalities suggestive of inferior ischemia.    PAST MEDICAL HISTORY:     Past Medical History:   Diagnosis Date    Anemia     Diabetes mellitus     Hyperlipidemia     Hypertension        PAST SURGICAL HISTORY:     Past Surgical History:   Procedure Laterality Date    HYSTERECTOMY         ALLERGIES AND MEDICATION:   Review of patient's allergies indicates:  No Known Allergies     Medication List          Accurate as of July 10, 2020  1:36 PM. If you have any questions, ask your nurse or doctor.            CONTINUE taking these medications    chlorthalidone 25 MG Tab  Commonly known as: HYGROTEN     diazePAM 10 MG Tab  Commonly known as: VALIUM  Take 1 tablet (10 mg total) by mouth every 8 (eight) hours as needed (muscle spasm).     ferrous sulfate 325 mg (65 mg iron) Tab tablet  Commonly known as: FEOSOL     insulin aspart U-100 100 unit/mL injection  Commonly known as:  NOVOLOG     LANTUS SOLOSTAR U-100 INSULIN glargine 100 units/mL (3mL) SubQ pen  Generic drug: insulin     lisinopriL 40 MG tablet  Commonly known as: PRINIVIL,ZESTRIL     meloxicam 15 MG tablet  Commonly known as: MOBIC  Take 1 tablet (15 mg total) by mouth once daily.     metoprolol succinate 50 MG 24 hr tablet  Commonly known as: TOPROL-XL     pantoprazole 40 MG tablet  Commonly known as: PROTONIX  Take 1 tablet (40 mg total) by mouth once daily.     potassium chloride 10 MEQ Cpsr  Commonly known as: MICRO-K     pravastatin 80 MG tablet  Commonly known as: PRAVACHOL            SOCIAL HISTORY:     Social History     Socioeconomic History    Marital status: Single     Spouse name: Not on file    Number of children: Not on file    Years of education: Not on file    Highest education level: Not on file   Occupational History    Not on file   Social Needs    Financial resource strain: Not on file    Food insecurity     Worry: Not on file     Inability: Not on file    Transportation needs     Medical: Not on file     Non-medical: Not on file   Tobacco Use    Smoking status: Never Smoker    Smokeless tobacco: Never Used   Substance and Sexual Activity    Alcohol use: No    Drug use: Not on file    Sexual activity: Not on file   Lifestyle    Physical activity     Days per week: Not on file     Minutes per session: Not on file    Stress: Not on file   Relationships    Social connections     Talks on phone: Not on file     Gets together: Not on file     Attends Islam service: Not on file     Active member of club or organization: Not on file     Attends meetings of clubs or organizations: Not on file     Relationship status: Not on file   Other Topics Concern    Not on file   Social History Narrative    Not on file       FAMILY HISTORY:   History reviewed. No pertinent family history.    REVIEW OF SYSTEMS:   Review of Systems   Constitution: Negative.   HENT: Negative.    Eyes: Negative.     Cardiovascular: Positive for chest pain.   Respiratory: Negative.    Endocrine: Negative.    Hematologic/Lymphatic: Negative.    Skin: Negative.    Musculoskeletal: Negative.    Gastrointestinal: Negative.    Genitourinary: Negative.    Neurological: Negative.    Psychiatric/Behavioral: Negative.    Allergic/Immunologic: Negative.        A 10 point review of systems was performed and all the pertinent positives have been mentioned. Rest of review of systems was negative.        PHYSICAL EXAM:     Vitals:    07/10/20 1319   BP: 122/72   Pulse: 67   Resp: 16    Body mass index is 35.16 kg/m².  Weight: 98.8 kg (217 lb 13 oz)         Physical Exam   Constitutional: She is oriented to person, place, and time. She appears well-developed and well-nourished.   HENT:   Head: Normocephalic.   Eyes: Pupils are equal, round, and reactive to light.   Neck: Normal range of motion. Neck supple.   Cardiovascular: Normal rate and regular rhythm.   Pulmonary/Chest: Effort normal and breath sounds normal.   Abdominal: Soft. Normal appearance and bowel sounds are normal. There is no abdominal tenderness.   Musculoskeletal: Normal range of motion.   Neurological: She is alert and oriented to person, place, and time.   Skin: Skin is warm.   Psychiatric: She has a normal mood and affect.   Nursing note and vitals reviewed.        DATA:     Laboratory:  CBC:  Recent Labs   Lab 04/27/20 0311   WBC 7.62   Hemoglobin 11.1 L   Hematocrit 37.0   Platelets 260       CHEMISTRIES:  Recent Labs   Lab 04/27/20 0311   Glucose 320 H   Sodium 138   Potassium 3.9   BUN, Bld 13   Creatinine 1.2   eGFR if  56 A   eGFR if non African American 49 A   Calcium 9.6       CARDIAC BIOMARKERS:  Recent Labs   Lab 04/26/20 2046 04/27/20  0311 04/27/20  0956   Troponin I 0.025 0.028 H 0.010       COAGS:        LIPIDS/LFTS:  Recent Labs   Lab 04/26/20 2046 04/27/20  0311   Cholesterol 180  --    Triglycerides 181 H  --    HDL 48  --    LDL  Cholesterol 95.8  --    Non-HDL Cholesterol 132  --    AST  --  9 L   ALT  --  8 L       Hemoglobin A1C   Date Value Ref Range Status   03/04/2020 9.3 (H) 4.0 - 5.6 % Final       TSH  Recent Labs   Lab 04/26/20 2046   TSH 0.491       The 10-year ASCVD risk score (Philip SCHULZ Jr., et al., 2013) is: 14.2%    Values used to calculate the score:      Age: 59 years      Sex: Female      Is Non- : Yes      Diabetic: Yes      Tobacco smoker: No      Systolic Blood Pressure: 122 mmHg      Is BP treated: Yes      HDL Cholesterol: 48 mg/dL      Total Cholesterol: 180 mg/dL             ASSESSMENT AND PLAN     Patient Active Problem List   Diagnosis    Chest pain    DM (diabetes mellitus)    Hypertension    HLD (hyperlipidemia)    Anemia    Obesity (BMI 30-39.9)       Chest pressure in a patient with multiple risk factors for coronary artery disease.   Patient states he will not be able to run on a treadmill because of arthritis and hence we will do pharmacological stress test.    We will order a nuclear stress test with the patient.  To she states she will not be able to run on a treadmill because of arthritis.  We will do a pharmacological stress test.        Thank you very much for involving me in the care of your patient.  Please do not hesitate to contact me if there are any questions.      César Aggarwal MD, FAC, [facility]  Interventional Cardiologist, [facility].           This note was dictated with the help of speech recognition software.  There might be un-intended errors and/or substitutions.

## 2020-07-31 ENCOUNTER — HOSPITAL ENCOUNTER (OUTPATIENT)
Dept: RADIOLOGY | Facility: HOSPITAL | Age: 60
Discharge: HOME | End: 2020-07-31
Attending: INTERNAL MEDICINE
Payer: MEDICAID

## 2020-07-31 ENCOUNTER — HOSPITAL ENCOUNTER (OUTPATIENT)
Dept: CARDIOLOGY | Facility: HOSPITAL | Age: 60
Discharge: HOME | End: 2020-07-31
Attending: INTERNAL MEDICINE
Payer: MEDICAID

## 2020-07-31 DIAGNOSIS — R07.9 CHEST PAIN, UNSPECIFIED TYPE: ICD-10-CM

## 2020-07-31 LAB
CV STRESS BASE HR: 57 BPM
DIASTOLIC BLOOD PRESSURE: 94 MMHG
NUC REST DIASTOLIC VOLUME INDEX: 51
NUC REST EJECTION FRACTION: 62
NUC REST SYSTOLIC VOLUME INDEX: 19
OHS CV CPX 85 PERCENT MAX PREDICTED HEART RATE MALE: 129
OHS CV CPX MAX PREDICTED HEART RATE: 152
OHS CV CPX PATIENT IS FEMALE: 1
OHS CV CPX PATIENT IS MALE: 0
OHS CV CPX PEAK DIASTOLIC BLOOD PRESSURE: 73 MMHG
OHS CV CPX PEAK HEAR RATE: 96 BPM
OHS CV CPX PEAK RATE PRESSURE PRODUCT: NORMAL
OHS CV CPX PEAK SYSTOLIC BLOOD PRESSURE: 108 MMHG
OHS CV CPX PERCENT MAX PREDICTED HEART RATE ACHIEVED: 63
OHS CV CPX RATE PRESSURE PRODUCT PRESENTING: 7695
SUMMED DIFFERENCE: 10
SUMMED REST SCORE: 8
SUMMED STRESS SCORE: 18
SYSTOLIC BLOOD PRESSURE: 135 MMHG

## 2020-07-31 PROCEDURE — 93016 CV STRESS TEST SUPVJ ONLY: CPT | Mod: ,,, | Performed by: INTERNAL MEDICINE

## 2020-07-31 PROCEDURE — 78452 HT MUSCLE IMAGE SPECT MULT: CPT | Mod: 26,,, | Performed by: INTERNAL MEDICINE

## 2020-07-31 PROCEDURE — 78452 STRESS TEST WITH MYOCARDIAL PERFUSION (CUPID ONLY): ICD-10-PCS | Mod: 26,,, | Performed by: INTERNAL MEDICINE

## 2020-07-31 PROCEDURE — A9502 TC99M TETROFOSMIN: HCPCS

## 2020-07-31 PROCEDURE — 93016 STRESS TEST WITH MYOCARDIAL PERFUSION (CUPID ONLY): ICD-10-PCS | Mod: ,,, | Performed by: INTERNAL MEDICINE

## 2020-07-31 PROCEDURE — 93018 STRESS TEST WITH MYOCARDIAL PERFUSION (CUPID ONLY): ICD-10-PCS | Mod: ,,, | Performed by: INTERNAL MEDICINE

## 2020-07-31 PROCEDURE — 93018 CV STRESS TEST I&R ONLY: CPT | Mod: ,,, | Performed by: INTERNAL MEDICINE

## 2020-07-31 PROCEDURE — 93017 CV STRESS TEST TRACING ONLY: CPT

## 2020-07-31 PROCEDURE — 63600175 PHARM REV CODE 636 W HCPCS: Performed by: INTERNAL MEDICINE

## 2020-07-31 RX ORDER — REGADENOSON 0.08 MG/ML
0.4 INJECTION, SOLUTION INTRAVENOUS ONCE
Status: COMPLETED | OUTPATIENT
Start: 2020-07-31 | End: 2020-07-31

## 2020-07-31 RX ADMIN — REGADENOSON 0.4 MG: 0.08 INJECTION, SOLUTION INTRAVENOUS at 10:07

## 2020-08-07 ENCOUNTER — OFFICE VISIT (OUTPATIENT)
Dept: CARDIOLOGY | Facility: CLINIC | Age: 60
End: 2020-08-07
Payer: MEDICAID

## 2020-08-07 VITALS
RESPIRATION RATE: 16 BRPM | SYSTOLIC BLOOD PRESSURE: 138 MMHG | BODY MASS INDEX: 35.16 KG/M2 | HEART RATE: 73 BPM | DIASTOLIC BLOOD PRESSURE: 88 MMHG | OXYGEN SATURATION: 97 % | WEIGHT: 217.81 LBS

## 2020-08-07 DIAGNOSIS — I25.10 CAD (CORONARY ARTERY DISEASE): ICD-10-CM

## 2020-08-07 DIAGNOSIS — E66.9 OBESITY (BMI 30-39.9): Chronic | ICD-10-CM

## 2020-08-07 DIAGNOSIS — E66.01 MORBID OBESITY: ICD-10-CM

## 2020-08-07 DIAGNOSIS — I10 ESSENTIAL HYPERTENSION: Chronic | ICD-10-CM

## 2020-08-07 DIAGNOSIS — I25.118 CORONARY ARTERY DISEASE OF NATIVE ARTERY OF NATIVE HEART WITH STABLE ANGINA PECTORIS: Primary | ICD-10-CM

## 2020-08-07 DIAGNOSIS — E78.5 DYSLIPIDEMIA: ICD-10-CM

## 2020-08-07 DIAGNOSIS — E11.69 TYPE 2 DIABETES MELLITUS WITH OTHER SPECIFIED COMPLICATION, UNSPECIFIED WHETHER LONG TERM INSULIN USE: Chronic | ICD-10-CM

## 2020-08-07 DIAGNOSIS — E78.5 HYPERLIPIDEMIA, UNSPECIFIED HYPERLIPIDEMIA TYPE: Chronic | ICD-10-CM

## 2020-08-07 PROCEDURE — 99999 PR PBB SHADOW E&M-EST. PATIENT-LVL IV: ICD-10-PCS | Mod: PBBFAC,,, | Performed by: INTERNAL MEDICINE

## 2020-08-07 PROCEDURE — 99215 PR OFFICE/OUTPT VISIT, EST, LEVL V, 40-54 MIN: ICD-10-PCS | Mod: S$PBB,,, | Performed by: INTERNAL MEDICINE

## 2020-08-07 PROCEDURE — 99999 PR PBB SHADOW E&M-EST. PATIENT-LVL IV: CPT | Mod: PBBFAC,,, | Performed by: INTERNAL MEDICINE

## 2020-08-07 PROCEDURE — 99215 OFFICE O/P EST HI 40 MIN: CPT | Mod: S$PBB,,, | Performed by: INTERNAL MEDICINE

## 2020-08-07 PROCEDURE — 99214 OFFICE O/P EST MOD 30 MIN: CPT | Mod: PBBFAC,PO | Performed by: INTERNAL MEDICINE

## 2020-08-07 RX ORDER — CLOPIDOGREL BISULFATE 75 MG/1
75 TABLET ORAL DAILY
Qty: 30 TABLET | Refills: 11 | Status: SHIPPED | OUTPATIENT
Start: 2020-08-07 | End: 2020-09-06

## 2020-08-07 RX ORDER — SODIUM CHLORIDE 9 MG/ML
INJECTION, SOLUTION INTRAVENOUS CONTINUOUS
Status: CANCELLED | OUTPATIENT
Start: 2020-08-07

## 2020-08-07 RX ORDER — ATORVASTATIN CALCIUM 40 MG/1
40 TABLET, FILM COATED ORAL NIGHTLY
Qty: 90 TABLET | Refills: 3 | Status: SHIPPED | OUTPATIENT
Start: 2020-08-07 | End: 2021-05-10

## 2020-08-07 NOTE — H&P (VIEW-ONLY)
CARDIOVASCULAR CONSULTATION    REASON FOR CONSULT:   Gala Alcaraz is a 59 y.o. female who presents for evaluation of chest pain.      HISTORY OF PRESENT ILLNESS:     Patient is a pleasant 59-year-old lady.  With a past medical history significant for recent admission for chest tightness.  Other past medical history includes diabetes, dyslipidemia and hypertension.  She was having substernal chest pressure.  No aggravating or relieving factors.  Denies any orthopnea, PND.  Could occur at rest or on exertion.  An echocardiogram was done  which showed normal left ventricular systolic function.    · Normal left ventricular systolic function. The estimated ejection fraction is 55%.  · Concentric left ventricular remodeling.  · No wall motion abnormalities.  · Normal right ventricular systolic function    Notes from July 2020:  Patient here for follow-up.  He has on and off chest pressure.  Substernal in nature, associated with shortness of breath.  On going up 1 flight of stairs.  No radiation.  EKG abnormal.  Shows sinus bradycardia with T-wave abnormalities suggestive of inferior ischemia.    Notes from August 2020:    Patient here for follow-up.  Stress test showed significant severe large ischemia in the anterior wall.  Patient denies any orthopnea, PND.  Denies resting chest pains.      The study shows abnormal myocardial perfusion.    Abnormal myocardial perfusion study.    Perfusion Defect There is a moderate to severe intensity, large sized, mostly reversible with some fixed areas defect in the basal to distal anterolateral wall(s).    Gated perfusion images showed an ejection fraction of 62% at rest and post stress.    The EKG portion of this study is abnormal but not diagnostic.    The patient reported no chest pain during the stress test.    There were no arrhythmias during stress.      · Normal left ventricular systolic function. The estimated ejection fraction is 55%.  · Concentric left ventricular  remodeling.  · No wall motion abnormalities.  · Normal right ventricular systolic function.           PAST MEDICAL HISTORY:     Past Medical History:   Diagnosis Date    Anemia     Diabetes mellitus     Hyperlipidemia     Hypertension        PAST SURGICAL HISTORY:     Past Surgical History:   Procedure Laterality Date    HYSTERECTOMY         ALLERGIES AND MEDICATION:   Review of patient's allergies indicates:  No Known Allergies     Medication List          Accurate as of August 7, 2020  2:25 PM. If you have any questions, ask your nurse or doctor.            CONTINUE taking these medications    chlorthalidone 25 MG Tab  Commonly known as: HYGROTEN     diazePAM 10 MG Tab  Commonly known as: VALIUM  Take 1 tablet (10 mg total) by mouth every 8 (eight) hours as needed (muscle spasm).     ferrous sulfate 325 mg (65 mg iron) Tab tablet  Commonly known as: FEOSOL     insulin aspart U-100 100 unit/mL injection  Commonly known as: NOVOLOG     LANTUS SOLOSTAR U-100 INSULIN glargine 100 units/mL (3mL) SubQ pen  Generic drug: insulin     lisinopriL 40 MG tablet  Commonly known as: PRINIVIL,ZESTRIL     meloxicam 15 MG tablet  Commonly known as: MOBIC  Take 1 tablet (15 mg total) by mouth once daily.     metoprolol succinate 50 MG 24 hr tablet  Commonly known as: TOPROL-XL     pantoprazole 40 MG tablet  Commonly known as: PROTONIX  Take 1 tablet (40 mg total) by mouth once daily.     potassium chloride 10 MEQ Cpsr  Commonly known as: MICRO-K     pravastatin 80 MG tablet  Commonly known as: PRAVACHOL            SOCIAL HISTORY:     Social History     Socioeconomic History    Marital status: Single     Spouse name: Not on file    Number of children: Not on file    Years of education: Not on file    Highest education level: Not on file   Occupational History    Not on file   Social Needs    Financial resource strain: Not on file    Food insecurity     Worry: Not on file     Inability: Not on file    Transportation  needs     Medical: Not on file     Non-medical: Not on file   Tobacco Use    Smoking status: Never Smoker    Smokeless tobacco: Never Used   Substance and Sexual Activity    Alcohol use: No    Drug use: Not on file    Sexual activity: Not on file   Lifestyle    Physical activity     Days per week: Not on file     Minutes per session: Not on file    Stress: Not on file   Relationships    Social connections     Talks on phone: Not on file     Gets together: Not on file     Attends [employer]al service: Not on file     Active member of club or organization: Not on file     Attends meetings of clubs or organizations: Not on file     Relationship status: Not on file   Other Topics Concern    Not on file   Social History Narrative    Not on file       FAMILY HISTORY:   History reviewed. No pertinent family history.    REVIEW OF SYSTEMS:   Review of Systems   Constitution: Negative.   HENT: Negative.    Eyes: Negative.    Cardiovascular: Positive for chest pain.   Respiratory: Negative.    Endocrine: Negative.    Hematologic/Lymphatic: Negative.    Skin: Negative.    Musculoskeletal: Negative.    Gastrointestinal: Negative.    Genitourinary: Negative.    Neurological: Negative.    Psychiatric/Behavioral: Negative.    Allergic/Immunologic: Negative.        A 10 point review of systems was performed and all the pertinent positives have been mentioned. Rest of review of systems was negative.        PHYSICAL EXAM:     Vitals:    08/07/20 1359   BP: 138/88   Pulse: 73   Resp: 16    Body mass index is 35.16 kg/m².  Weight: 98.8 kg (217 lb 13 oz)         Physical Exam   Constitutional: She is oriented to person, place, and time. She appears well-developed and well-nourished.   HENT:   Head: Normocephalic.   Eyes: Pupils are equal, round, and reactive to light.   Neck: Normal range of motion. Neck supple.   Cardiovascular: Normal rate and regular rhythm.   Pulmonary/Chest: Effort normal and breath sounds normal.    Abdominal: Soft. Normal appearance and bowel sounds are normal. There is no abdominal tenderness.   Musculoskeletal: Normal range of motion.   Neurological: She is alert and oriented to person, place, and time.   Skin: Skin is warm.   Psychiatric: She has a normal mood and affect.   Nursing note and vitals reviewed.        DATA:     Laboratory:  CBC:  Recent Labs   Lab 04/27/20 0311   WBC 7.62   Hemoglobin 11.1 L   Hematocrit 37.0   Platelets 260       CHEMISTRIES:  Recent Labs   Lab 04/27/20  0311   Glucose 320 H   Sodium 138   Potassium 3.9   BUN, Bld 13   Creatinine 1.2   eGFR if  56 A   eGFR if non African American 49 A   Calcium 9.6       CARDIAC BIOMARKERS:  Recent Labs   Lab 04/26/20 2046 04/27/20 0311 04/27/20  0956   Troponin I 0.025 0.028 H 0.010       COAGS:        LIPIDS/LFTS:  Recent Labs   Lab 04/26/20 2046 04/27/20 0311   Cholesterol 180  --    Triglycerides 181 H  --    HDL 48  --    LDL Cholesterol 95.8  --    Non-HDL Cholesterol 132  --    AST  --  9 L   ALT  --  8 L       Hemoglobin A1C   Date Value Ref Range Status   03/04/2020 9.3 (H) 4.0 - 5.6 % Final       TSH  Recent Labs   Lab 04/26/20 2046   TSH 0.491       The 10-year ASCVD risk score (Philipalicia SCHULZ Jr., et al., 2013) is: 19.5%    Values used to calculate the score:      Age: 59 years      Sex: Female      Is Non- : Yes      Diabetic: Yes      Tobacco smoker: No      Systolic Blood Pressure: 138 mmHg      Is BP treated: Yes      HDL Cholesterol: 48 mg/dL      Total Cholesterol: 180 mg/dL             ASSESSMENT AND PLAN     Patient Active Problem List   Diagnosis    Chest pain    DM (diabetes mellitus)    Hypertension    HLD (hyperlipidemia)    Anemia    Obesity (BMI 30-39.9)    Dyslipidemia    Morbid obesity       Chest pressure in a patient with multiple risk factors for coronary artery disease.   Large area of severe anterior wall defect.  Continue aspirin.  Start Plavix 75 mg daily.   Discussed various options including performing a coronary angiogram.  Risks, benefits and alternatives of the catheterization procedure were discussed with the patient.The risks of coronary angiography include but are not limited to: bleeding, infection, death, heart attack, arrhythmia, kidney injury or failure, potential need for dialysis, allergic reactions, stroke, need for emergency surgery, hematoma, pseudoaneurysm etc.  Should stenting be indicated, the patient has agreed to dual anti-platelet therapy for 1-consecutive year with a drug-eluting stent and a minimum of 1-month with the use of a bare metal stent. Additionally, pt is aware that non-compliance is likely to result in stent clotting with heart attack, heart failure, and/or death  The risks of moderate sedation include hypotension, respiratory depression, arrhythmias, bronchospasm, and death. Informed consent was obtained and the  patient is agreeable to proceed with the procedure. Consent was placed on the chart.    Follow-up after testing.      Thank you very much for involving me in the care of your patient.  Please do not hesitate to contact me if there are any questions.      César Aggarwal MD, FACC, [facility]  Interventional Cardiologist, [facility].           This note was dictated with the help of speech recognition software.  There might be un-intended errors and/or substitutions.

## 2020-08-07 NOTE — PROGRESS NOTES
CARDIOVASCULAR CONSULTATION    REASON FOR CONSULT:   Gala Alcaraz is a 59 y.o. female who presents for evaluation of chest pain.      HISTORY OF PRESENT ILLNESS:     Patient is a pleasant 59-year-old lady.  With a past medical history significant for recent admission for chest tightness.  Other past medical history includes diabetes, dyslipidemia and hypertension.  She was having substernal chest pressure.  No aggravating or relieving factors.  Denies any orthopnea, PND.  Could occur at rest or on exertion.  An echocardiogram was done  which showed normal left ventricular systolic function.    · Normal left ventricular systolic function. The estimated ejection fraction is 55%.  · Concentric left ventricular remodeling.  · No wall motion abnormalities.  · Normal right ventricular systolic function    Notes from July 2020:  Patient here for follow-up.  He has on and off chest pressure.  Substernal in nature, associated with shortness of breath.  On going up 1 flight of stairs.  No radiation.  EKG abnormal.  Shows sinus bradycardia with T-wave abnormalities suggestive of inferior ischemia.    Notes from August 2020:    Patient here for follow-up.  Stress test showed significant severe large ischemia in the anterior wall.  Patient denies any orthopnea, PND.  Denies resting chest pains.      The study shows abnormal myocardial perfusion.    Abnormal myocardial perfusion study.    Perfusion Defect There is a moderate to severe intensity, large sized, mostly reversible with some fixed areas defect in the basal to distal anterolateral wall(s).    Gated perfusion images showed an ejection fraction of 62% at rest and post stress.    The EKG portion of this study is abnormal but not diagnostic.    The patient reported no chest pain during the stress test.    There were no arrhythmias during stress.      · Normal left ventricular systolic function. The estimated ejection fraction is 55%.  · Concentric left ventricular  remodeling.  · No wall motion abnormalities.  · Normal right ventricular systolic function.           PAST MEDICAL HISTORY:     Past Medical History:   Diagnosis Date    Anemia     Diabetes mellitus     Hyperlipidemia     Hypertension        PAST SURGICAL HISTORY:     Past Surgical History:   Procedure Laterality Date    HYSTERECTOMY         ALLERGIES AND MEDICATION:   Review of patient's allergies indicates:  No Known Allergies     Medication List          Accurate as of August 7, 2020  2:25 PM. If you have any questions, ask your nurse or doctor.            CONTINUE taking these medications    chlorthalidone 25 MG Tab  Commonly known as: HYGROTEN     diazePAM 10 MG Tab  Commonly known as: VALIUM  Take 1 tablet (10 mg total) by mouth every 8 (eight) hours as needed (muscle spasm).     ferrous sulfate 325 mg (65 mg iron) Tab tablet  Commonly known as: FEOSOL     insulin aspart U-100 100 unit/mL injection  Commonly known as: NOVOLOG     LANTUS SOLOSTAR U-100 INSULIN glargine 100 units/mL (3mL) SubQ pen  Generic drug: insulin     lisinopriL 40 MG tablet  Commonly known as: PRINIVIL,ZESTRIL     meloxicam 15 MG tablet  Commonly known as: MOBIC  Take 1 tablet (15 mg total) by mouth once daily.     metoprolol succinate 50 MG 24 hr tablet  Commonly known as: TOPROL-XL     pantoprazole 40 MG tablet  Commonly known as: PROTONIX  Take 1 tablet (40 mg total) by mouth once daily.     potassium chloride 10 MEQ Cpsr  Commonly known as: MICRO-K     pravastatin 80 MG tablet  Commonly known as: PRAVACHOL            SOCIAL HISTORY:     Social History     Socioeconomic History    Marital status: Single     Spouse name: Not on file    Number of children: Not on file    Years of education: Not on file    Highest education level: Not on file   Occupational History    Not on file   Social Needs    Financial resource strain: Not on file    Food insecurity     Worry: Not on file     Inability: Not on file    Transportation  needs     Medical: Not on file     Non-medical: Not on file   Tobacco Use    Smoking status: Never Smoker    Smokeless tobacco: Never Used   Substance and Sexual Activity    Alcohol use: No    Drug use: Not on file    Sexual activity: Not on file   Lifestyle    Physical activity     Days per week: Not on file     Minutes per session: Not on file    Stress: Not on file   Relationships    Social connections     Talks on phone: Not on file     Gets together: Not on file     Attends Scientologist service: Not on file     Active member of club or organization: Not on file     Attends meetings of clubs or organizations: Not on file     Relationship status: Not on file   Other Topics Concern    Not on file   Social History Narrative    Not on file       FAMILY HISTORY:   History reviewed. No pertinent family history.    REVIEW OF SYSTEMS:   Review of Systems   Constitution: Negative.   HENT: Negative.    Eyes: Negative.    Cardiovascular: Positive for chest pain.   Respiratory: Negative.    Endocrine: Negative.    Hematologic/Lymphatic: Negative.    Skin: Negative.    Musculoskeletal: Negative.    Gastrointestinal: Negative.    Genitourinary: Negative.    Neurological: Negative.    Psychiatric/Behavioral: Negative.    Allergic/Immunologic: Negative.        A 10 point review of systems was performed and all the pertinent positives have been mentioned. Rest of review of systems was negative.        PHYSICAL EXAM:     Vitals:    08/07/20 1359   BP: 138/88   Pulse: 73   Resp: 16    Body mass index is 35.16 kg/m².  Weight: 98.8 kg (217 lb 13 oz)         Physical Exam   Constitutional: She is oriented to person, place, and time. She appears well-developed and well-nourished.   HENT:   Head: Normocephalic.   Eyes: Pupils are equal, round, and reactive to light.   Neck: Normal range of motion. Neck supple.   Cardiovascular: Normal rate and regular rhythm.   Pulmonary/Chest: Effort normal and breath sounds normal.    Abdominal: Soft. Normal appearance and bowel sounds are normal. There is no abdominal tenderness.   Musculoskeletal: Normal range of motion.   Neurological: She is alert and oriented to person, place, and time.   Skin: Skin is warm.   Psychiatric: She has a normal mood and affect.   Nursing note and vitals reviewed.        DATA:     Laboratory:  CBC:  Recent Labs   Lab 04/27/20 0311   WBC 7.62   Hemoglobin 11.1 L   Hematocrit 37.0   Platelets 260       CHEMISTRIES:  Recent Labs   Lab 04/27/20  0311   Glucose 320 H   Sodium 138   Potassium 3.9   BUN, Bld 13   Creatinine 1.2   eGFR if  56 A   eGFR if non African American 49 A   Calcium 9.6       CARDIAC BIOMARKERS:  Recent Labs   Lab 04/26/20 2046 04/27/20 0311 04/27/20  0956   Troponin I 0.025 0.028 H 0.010       COAGS:        LIPIDS/LFTS:  Recent Labs   Lab 04/26/20 2046 04/27/20 0311   Cholesterol 180  --    Triglycerides 181 H  --    HDL 48  --    LDL Cholesterol 95.8  --    Non-HDL Cholesterol 132  --    AST  --  9 L   ALT  --  8 L       Hemoglobin A1C   Date Value Ref Range Status   03/04/2020 9.3 (H) 4.0 - 5.6 % Final       TSH  Recent Labs   Lab 04/26/20 2046   TSH 0.491       The 10-year ASCVD risk score (Philipalicia SCHULZ Jr., et al., 2013) is: 19.5%    Values used to calculate the score:      Age: 59 years      Sex: Female      Is Non- : Yes      Diabetic: Yes      Tobacco smoker: No      Systolic Blood Pressure: 138 mmHg      Is BP treated: Yes      HDL Cholesterol: 48 mg/dL      Total Cholesterol: 180 mg/dL             ASSESSMENT AND PLAN     Patient Active Problem List   Diagnosis    Chest pain    DM (diabetes mellitus)    Hypertension    HLD (hyperlipidemia)    Anemia    Obesity (BMI 30-39.9)    Dyslipidemia    Morbid obesity       Chest pressure in a patient with multiple risk factors for coronary artery disease.   Large area of severe anterior wall defect.  Continue aspirin.  Start Plavix 75 mg daily.   Discussed various options including performing a coronary angiogram.  Risks, benefits and alternatives of the catheterization procedure were discussed with the patient.The risks of coronary angiography include but are not limited to: bleeding, infection, death, heart attack, arrhythmia, kidney injury or failure, potential need for dialysis, allergic reactions, stroke, need for emergency surgery, hematoma, pseudoaneurysm etc.  Should stenting be indicated, the patient has agreed to dual anti-platelet therapy for 1-consecutive year with a drug-eluting stent and a minimum of 1-month with the use of a bare metal stent. Additionally, pt is aware that non-compliance is likely to result in stent clotting with heart attack, heart failure, and/or death  The risks of moderate sedation include hypotension, respiratory depression, arrhythmias, bronchospasm, and death. Informed consent was obtained and the  patient is agreeable to proceed with the procedure. Consent was placed on the chart.    Follow-up after testing.      Thank you very much for involving me in the care of your patient.  Please do not hesitate to contact me if there are any questions.      César Aggarwal MD, FACC, [facility]  Interventional Cardiologist, [facility].           This note was dictated with the help of speech recognition software.  There might be un-intended errors and/or substitutions.

## 2020-08-13 ENCOUNTER — HOSPITAL ENCOUNTER (OUTPATIENT)
Dept: PREADMISSION TESTING | Facility: HOSPITAL | Age: 60
Discharge: HOME | End: 2020-08-13
Attending: INTERNAL MEDICINE
Payer: MEDICAID

## 2020-08-13 ENCOUNTER — TELEPHONE (OUTPATIENT)
Dept: CARDIOLOGY | Facility: CLINIC | Age: 60
End: 2020-08-13

## 2020-08-13 VITALS
RESPIRATION RATE: 18 BRPM | SYSTOLIC BLOOD PRESSURE: 113 MMHG | WEIGHT: 215.19 LBS | HEIGHT: 66 IN | HEART RATE: 69 BPM | OXYGEN SATURATION: 97 % | BODY MASS INDEX: 34.58 KG/M2 | DIASTOLIC BLOOD PRESSURE: 78 MMHG | TEMPERATURE: 98 F

## 2020-08-13 DIAGNOSIS — Z01.818 PREOP TESTING: ICD-10-CM

## 2020-08-13 DIAGNOSIS — I25.118 CORONARY ARTERY DISEASE OF NATIVE ARTERY OF NATIVE HEART WITH STABLE ANGINA PECTORIS: ICD-10-CM

## 2020-08-13 LAB
ANION GAP SERPL CALC-SCNC: 8 MMOL/L (ref 8–16)
BASOPHILS # BLD AUTO: 0.02 K/UL (ref 0–0.2)
BASOPHILS NFR BLD: 0.3 % (ref 0–1.9)
BUN SERPL-MCNC: 23 MG/DL (ref 8–23)
CALCIUM SERPL-MCNC: 9.6 MG/DL (ref 8.7–10.5)
CHLORIDE SERPL-SCNC: 96 MMOL/L (ref 95–110)
CO2 SERPL-SCNC: 31 MMOL/L (ref 23–29)
CREAT SERPL-MCNC: 1.6 MG/DL (ref 0.5–1.4)
DIFFERENTIAL METHOD: ABNORMAL
EOSINOPHIL # BLD AUTO: 0 K/UL (ref 0–0.5)
EOSINOPHIL NFR BLD: 0.6 % (ref 0–8)
ERYTHROCYTE [DISTWIDTH] IN BLOOD BY AUTOMATED COUNT: 13.4 % (ref 11.5–14.5)
EST. GFR  (AFRICAN AMERICAN): 40 ML/MIN/1.73 M^2
EST. GFR  (NON AFRICAN AMERICAN): 35 ML/MIN/1.73 M^2
GLUCOSE SERPL-MCNC: 505 MG/DL (ref 70–110)
HCT VFR BLD AUTO: 40.7 % (ref 37–48.5)
HGB BLD-MCNC: 12.5 G/DL (ref 12–16)
IMM GRANULOCYTES # BLD AUTO: 0.02 K/UL (ref 0–0.04)
IMM GRANULOCYTES NFR BLD AUTO: 0.3 % (ref 0–0.5)
LYMPHOCYTES # BLD AUTO: 2 K/UL (ref 1–4.8)
LYMPHOCYTES NFR BLD: 28 % (ref 18–48)
MCH RBC QN AUTO: 23.5 PG (ref 27–31)
MCHC RBC AUTO-ENTMCNC: 30.7 G/DL (ref 32–36)
MCV RBC AUTO: 76 FL (ref 82–98)
MONOCYTES # BLD AUTO: 0.7 K/UL (ref 0.3–1)
MONOCYTES NFR BLD: 9.5 % (ref 4–15)
NEUTROPHILS # BLD AUTO: 4.4 K/UL (ref 1.8–7.7)
NEUTROPHILS NFR BLD: 61.3 % (ref 38–73)
NRBC BLD-RTO: 0 /100 WBC
PLATELET # BLD AUTO: 291 K/UL (ref 150–350)
PMV BLD AUTO: 11.5 FL (ref 9.2–12.9)
POTASSIUM SERPL-SCNC: 4.1 MMOL/L (ref 3.5–5.1)
RBC # BLD AUTO: 5.33 M/UL (ref 4–5.4)
SARS-COV-2 RDRP RESP QL NAA+PROBE: NEGATIVE
SODIUM SERPL-SCNC: 135 MMOL/L (ref 136–145)
WBC # BLD AUTO: 7.08 K/UL (ref 3.9–12.7)

## 2020-08-13 PROCEDURE — 36415 COLL VENOUS BLD VENIPUNCTURE: CPT

## 2020-08-13 PROCEDURE — U0002 COVID-19 LAB TEST NON-CDC: HCPCS

## 2020-08-13 PROCEDURE — 85025 COMPLETE CBC W/AUTO DIFF WBC: CPT

## 2020-08-13 PROCEDURE — 80048 BASIC METABOLIC PNL TOTAL CA: CPT

## 2020-08-13 NOTE — DISCHARGE INSTRUCTIONS
Your angiogram is scheduled for___8/14/2020____________    Call [phone] between 2pm and 5pm on __today__________to find out your arrival time for the day of your procedure.    Report to Same Day Surgery Unit at ____ AM on the 2nd floor of the hospital.  Enter through the Emergency Room.      IMPORTANT INSTRUCTIONS:  Do not eat or drink anything after midnight.  This includes water and coffee.  It is okay to brush your teeth.  Do not chew gum or have hard candy or mints.    Take your morning medications as instructed with small sips of water.     Do not take any diabetic medication.      Shower the night before your procedure and the morning of your procedure with Hibiclens as directed.     Do not wear make-up.     You may wear deodorant, but do not wear body lotion, powder or perfume/cologne       Do not wear jewelry.     You will be asked to remove any dentures or partials for the procedure.     You do not need money or valuables.  You may bring your cell phone.     If you are going home the same day, you must arrange for someone to drive you home.     Wear comfortable, loose fitting clothes.      Call your doctor if you have sickness or fever before your angiogram.     Our number in [facility] is [phone] if you need to contact us.     If you are going home the same day, you must arrange for a family member or a friend to drive you home.  Public transportation is prohibited.

## 2020-08-14 ENCOUNTER — HOSPITAL ENCOUNTER (OUTPATIENT)
Facility: HOSPITAL | Age: 60
Discharge: HOME | End: 2020-08-14
Attending: INTERNAL MEDICINE | Admitting: INTERNAL MEDICINE
Payer: MEDICAID

## 2020-08-14 VITALS
RESPIRATION RATE: 20 BRPM | DIASTOLIC BLOOD PRESSURE: 71 MMHG | HEART RATE: 64 BPM | TEMPERATURE: 98 F | WEIGHT: 215.19 LBS | SYSTOLIC BLOOD PRESSURE: 115 MMHG | BODY MASS INDEX: 34.73 KG/M2 | OXYGEN SATURATION: 99 %

## 2020-08-14 DIAGNOSIS — E66.9 OBESITY (BMI 30-39.9): Chronic | ICD-10-CM

## 2020-08-14 DIAGNOSIS — E11.69 TYPE 2 DIABETES MELLITUS WITH OTHER SPECIFIED COMPLICATION, UNSPECIFIED WHETHER LONG TERM INSULIN USE: Chronic | ICD-10-CM

## 2020-08-14 DIAGNOSIS — I10 ESSENTIAL HYPERTENSION: Chronic | ICD-10-CM

## 2020-08-14 DIAGNOSIS — D64.9 ANEMIA, UNSPECIFIED TYPE: Chronic | ICD-10-CM

## 2020-08-14 DIAGNOSIS — E78.5 DYSLIPIDEMIA: ICD-10-CM

## 2020-08-14 DIAGNOSIS — E78.5 HYPERLIPIDEMIA, UNSPECIFIED HYPERLIPIDEMIA TYPE: Chronic | ICD-10-CM

## 2020-08-14 DIAGNOSIS — I25.10 CAD (CORONARY ARTERY DISEASE): ICD-10-CM

## 2020-08-14 DIAGNOSIS — Z01.818 PREOP TESTING: Primary | ICD-10-CM

## 2020-08-14 DIAGNOSIS — E66.01 MORBID OBESITY: ICD-10-CM

## 2020-08-14 DIAGNOSIS — R07.9 CHEST PAIN, UNSPECIFIED TYPE: ICD-10-CM

## 2020-08-14 DIAGNOSIS — I25.118 CORONARY ARTERY DISEASE OF NATIVE ARTERY OF NATIVE HEART WITH STABLE ANGINA PECTORIS: ICD-10-CM

## 2020-08-14 LAB
POCT GLUCOSE: 343 MG/DL (ref 70–110)
POCT GLUCOSE: 369 MG/DL (ref 70–110)

## 2020-08-14 PROCEDURE — C1769 GUIDE WIRE: HCPCS | Performed by: INTERNAL MEDICINE

## 2020-08-14 PROCEDURE — 83036 HEMOGLOBIN GLYCOSYLATED A1C: CPT

## 2020-08-14 PROCEDURE — A4216 STERILE WATER/SALINE, 10 ML: HCPCS | Performed by: INTERNAL MEDICINE

## 2020-08-14 PROCEDURE — 25000003 PHARM REV CODE 250: Performed by: INTERNAL MEDICINE

## 2020-08-14 PROCEDURE — 93458 PR CATH PLACE/CORON ANGIO, IMG SUPER/INTERP,W LEFT HEART VENTRICULOGRAPHY: ICD-10-PCS | Mod: 26,,, | Performed by: INTERNAL MEDICINE

## 2020-08-14 PROCEDURE — 93458 L HRT ARTERY/VENTRICLE ANGIO: CPT | Mod: 26,,, | Performed by: INTERNAL MEDICINE

## 2020-08-14 PROCEDURE — 63600175 PHARM REV CODE 636 W HCPCS: Performed by: INTERNAL MEDICINE

## 2020-08-14 PROCEDURE — 99152 MOD SED SAME PHYS/QHP 5/>YRS: CPT | Performed by: INTERNAL MEDICINE

## 2020-08-14 PROCEDURE — 93458 L HRT ARTERY/VENTRICLE ANGIO: CPT | Performed by: INTERNAL MEDICINE

## 2020-08-14 PROCEDURE — 99152 MOD SED SAME PHYS/QHP 5/>YRS: CPT | Mod: ,,, | Performed by: INTERNAL MEDICINE

## 2020-08-14 PROCEDURE — C1887 CATHETER, GUIDING: HCPCS | Performed by: INTERNAL MEDICINE

## 2020-08-14 PROCEDURE — 99152 PR MOD CONSCIOUS SEDATION, SAME PHYS, 5+ YRS, FIRST 15 MIN: ICD-10-PCS | Mod: ,,, | Performed by: INTERNAL MEDICINE

## 2020-08-14 PROCEDURE — C1894 INTRO/SHEATH, NON-LASER: HCPCS | Performed by: INTERNAL MEDICINE

## 2020-08-14 PROCEDURE — 36415 COLL VENOUS BLD VENIPUNCTURE: CPT

## 2020-08-14 PROCEDURE — 25500020 PHARM REV CODE 255: Performed by: INTERNAL MEDICINE

## 2020-08-14 RX ORDER — GLUCAGON 1 MG
1 KIT INJECTION
Status: DISCONTINUED | OUTPATIENT
Start: 2020-08-14 | End: 2020-08-14 | Stop reason: HOSPADM

## 2020-08-14 RX ORDER — OXYCODONE HYDROCHLORIDE 5 MG/1
5 TABLET ORAL EVERY 4 HOURS PRN
Status: DISCONTINUED | OUTPATIENT
Start: 2020-08-14 | End: 2020-08-14 | Stop reason: HOSPADM

## 2020-08-14 RX ORDER — SODIUM CHLORIDE 9 MG/ML
INJECTION, SOLUTION INTRAVENOUS CONTINUOUS
Status: DISCONTINUED | OUTPATIENT
Start: 2020-08-14 | End: 2020-08-14 | Stop reason: HOSPADM

## 2020-08-14 RX ORDER — VERAPAMIL HYDROCHLORIDE 2.5 MG/ML
INJECTION, SOLUTION INTRAVENOUS
Status: DISCONTINUED | OUTPATIENT
Start: 2020-08-14 | End: 2020-08-14 | Stop reason: HOSPADM

## 2020-08-14 RX ORDER — SODIUM CHLORIDE 9 MG/ML
INJECTION, SOLUTION INTRAMUSCULAR; INTRAVENOUS; SUBCUTANEOUS
Status: DISCONTINUED | OUTPATIENT
Start: 2020-08-14 | End: 2020-08-14 | Stop reason: HOSPADM

## 2020-08-14 RX ORDER — INSULIN ASPART 100 [IU]/ML
1-10 INJECTION, SOLUTION INTRAVENOUS; SUBCUTANEOUS
Status: DISCONTINUED | OUTPATIENT
Start: 2020-08-14 | End: 2020-08-14 | Stop reason: HOSPADM

## 2020-08-14 RX ORDER — IODIXANOL 320 MG/ML
INJECTION, SOLUTION INTRAVASCULAR
Status: DISCONTINUED | OUTPATIENT
Start: 2020-08-14 | End: 2020-08-14 | Stop reason: HOSPADM

## 2020-08-14 RX ORDER — ACETAMINOPHEN 325 MG/1
650 TABLET ORAL EVERY 4 HOURS PRN
Status: DISCONTINUED | OUTPATIENT
Start: 2020-08-14 | End: 2020-08-14 | Stop reason: HOSPADM

## 2020-08-14 RX ORDER — MORPHINE SULFATE 10 MG/ML
3 INJECTION INTRAMUSCULAR; INTRAVENOUS; SUBCUTANEOUS
Status: DISCONTINUED | OUTPATIENT
Start: 2020-08-14 | End: 2020-08-14 | Stop reason: HOSPADM

## 2020-08-14 RX ORDER — IBUPROFEN 200 MG
16 TABLET ORAL
Status: DISCONTINUED | OUTPATIENT
Start: 2020-08-14 | End: 2020-08-14 | Stop reason: HOSPADM

## 2020-08-14 RX ORDER — MIDAZOLAM HYDROCHLORIDE 1 MG/ML
INJECTION, SOLUTION INTRAMUSCULAR; INTRAVENOUS
Status: DISCONTINUED | OUTPATIENT
Start: 2020-08-14 | End: 2020-08-14 | Stop reason: HOSPADM

## 2020-08-14 RX ORDER — LIDOCAINE HYDROCHLORIDE 10 MG/ML
INJECTION, SOLUTION EPIDURAL; INFILTRATION; INTRACAUDAL; PERINEURAL
Status: DISCONTINUED | OUTPATIENT
Start: 2020-08-14 | End: 2020-08-14 | Stop reason: HOSPADM

## 2020-08-14 RX ORDER — ASPIRIN 81 MG/1
81 TABLET ORAL DAILY
COMMUNITY

## 2020-08-14 RX ORDER — FENTANYL CITRATE 50 UG/ML
INJECTION, SOLUTION INTRAMUSCULAR; INTRAVENOUS
Status: DISCONTINUED | OUTPATIENT
Start: 2020-08-14 | End: 2020-08-14 | Stop reason: HOSPADM

## 2020-08-14 RX ORDER — HEPARIN SODIUM 1000 [USP'U]/ML
INJECTION, SOLUTION INTRAVENOUS; SUBCUTANEOUS
Status: DISCONTINUED | OUTPATIENT
Start: 2020-08-14 | End: 2020-08-14 | Stop reason: HOSPADM

## 2020-08-14 RX ORDER — IBUPROFEN 200 MG
24 TABLET ORAL
Status: DISCONTINUED | OUTPATIENT
Start: 2020-08-14 | End: 2020-08-14 | Stop reason: HOSPADM

## 2020-08-14 RX ORDER — NITROGLYCERIN 20 MG/100ML
INJECTION INTRAVENOUS
Status: DISCONTINUED | OUTPATIENT
Start: 2020-08-14 | End: 2020-08-14 | Stop reason: HOSPADM

## 2020-08-14 RX ORDER — ONDANSETRON 2 MG/ML
4 INJECTION INTRAMUSCULAR; INTRAVENOUS EVERY 12 HOURS PRN
Status: DISCONTINUED | OUTPATIENT
Start: 2020-08-14 | End: 2020-08-14 | Stop reason: HOSPADM

## 2020-08-14 RX ADMIN — INSULIN ASPART 8 UNITS: 100 INJECTION, SOLUTION INTRAVENOUS; SUBCUTANEOUS at 09:08

## 2020-08-14 RX ADMIN — SODIUM CHLORIDE: 0.9 INJECTION, SOLUTION INTRAVENOUS at 07:08

## 2020-08-14 NOTE — PLAN OF CARE
Pt verbalized readiness to go home and understanding of discharge instructions. Aware of emergency contacts and what to do if bleeding starts instructed remove dressing in 24 hours and replace bandaid daily for 5 days, bandaids provided

## 2020-08-14 NOTE — Clinical Note
52 ml injected throughout the case. 148 mL total wasted during the case. 200 mL total used in the case.

## 2020-08-14 NOTE — DISCHARGE INSTRUCTIONS
Limit movement of the wrist.  Do not bend wrist or perform heavy lifting for 24 hours.      NO blood pressure cuff or venipuncture to affected arm for 24 hours.    If bleeding occurs, apply pressure to site for 20 minutes. Apply band aid once bleeding stops.  Call 911 if unable to stop bleeding with pressure.     Keep your follow up appointment.      Do not drive, drink alcohol, or sign legal documents for 24 hours, or if taking narcotic pain medication.Fall Prevention  Millions of people fall every year and injure themselves. You may have had anesthesia or sedation which may increase your risk of falling. You may have health issues that put you at an increased risk of falling.     Here are ways to reduce your risk of falling.  ·   · Make your home safe by keeping walkways clear of objects you may trip over.  · Use non-slip pads under rugs. Do not use area rugs or small throw rugs.  · Use non-slip mats in bathtubs and showers.  · Install handrails and lights on staircases.  · Do not walk in poorly lit areas.  · Do not stand on chairs or wobbly ladders.  · Use caution when reaching overhead or looking upward. This position can cause a loss of balance.  · Be sure your shoes fit properly, have non-slip bottoms and are in good condition.   · Wear shoes both inside and out. Avoid going barefoot or wearing slippers.  · Be cautious when going up and down stairs, curbs, and when walking on uneven sidewalks.  · If your balance is poor, consider using a cane or walker.  · If your fall was related to alcohol use, stop or limit alcohol intake.   · If your fall was related to use of sleeping medicines, talk to your doctor about this. You may need to reduce your dosage at bedtime if you awaken during the night to go to the bathroom.    · To reduce the need for nighttime bathroom trips:  ¨ Avoid drinking fluids for several hours before going to bed  ¨ Empty your bladder before going to bed  ¨ Men can keep a urinal at the  bedside  · Stay as active as you can. Balance, flexibility, strength, and endurance all come from exercise. They all play a role in preventing falls. Ask your healthcare provider which types of activity are right for you.  · Get your vision checked on a regular basis.  · If you have pets, know where they are before you stand up or walk so you don't trip over them.  Use night lights.

## 2020-08-14 NOTE — Clinical Note
Catheter is repositioned to the and hemodynamics recorded ostium   right coronary artery. Angiography performed of the right coronary arteries in multiple views.

## 2020-08-15 LAB
ESTIMATED AVG GLUCOSE: ABNORMAL MG/DL (ref 68–131)
HBA1C MFR BLD HPLC: >14 % (ref 4–5.6)

## 2020-08-23 NOTE — INTERVAL H&P NOTE
The patient has been examined and the H&P has been reviewed:    I concur with the findings and no changes have occurred since H&P was written.    Anesthesia/Surgery risks, benefits and alternative options discussed and understood by patient/family.          Active Hospital Problems    Diagnosis  POA    CAD (coronary artery disease) [I25.10]  Yes     Priority: 1 - High      Resolved Hospital Problems   No resolved problems to display.

## 2020-08-27 ENCOUNTER — NURSE TRIAGE (OUTPATIENT)
Dept: ADMINISTRATIVE | Facility: CLINIC | Age: 60
End: 2020-08-27

## 2020-08-27 NOTE — TELEPHONE ENCOUNTER
Spoke with patient on behalf of post procedural symptom tracker. Pt denies difficulty breathing, cough or fever since procedure. Instructed to notify PCP or OOC if symptoms develop.    Reason for Disposition   Information only question and nurse able to answer    Additional Information   Negative: Nursing judgment   Negative: Nursing judgment   Negative: Nursing judgment   Negative: Nursing judgment    Protocols used: INFORMATION ONLY CALL - NO TRIAGE-A-OH

## 2020-08-28 ENCOUNTER — OFFICE VISIT (OUTPATIENT)
Dept: CARDIOLOGY | Facility: CLINIC | Age: 60
End: 2020-08-28
Payer: MEDICAID

## 2020-08-28 VITALS
DIASTOLIC BLOOD PRESSURE: 74 MMHG | BODY MASS INDEX: 34.72 KG/M2 | WEIGHT: 216.06 LBS | RESPIRATION RATE: 16 BRPM | HEIGHT: 66 IN | OXYGEN SATURATION: 98 % | HEART RATE: 69 BPM | SYSTOLIC BLOOD PRESSURE: 116 MMHG

## 2020-08-28 DIAGNOSIS — E78.5 DYSLIPIDEMIA: ICD-10-CM

## 2020-08-28 DIAGNOSIS — E78.5 HYPERLIPIDEMIA, UNSPECIFIED HYPERLIPIDEMIA TYPE: ICD-10-CM

## 2020-08-28 DIAGNOSIS — I10 ESSENTIAL HYPERTENSION: Primary | ICD-10-CM

## 2020-08-28 DIAGNOSIS — E11.69 TYPE 2 DIABETES MELLITUS WITH OTHER SPECIFIED COMPLICATION, UNSPECIFIED WHETHER LONG TERM INSULIN USE: ICD-10-CM

## 2020-08-28 DIAGNOSIS — E66.01 MORBID OBESITY: ICD-10-CM

## 2020-08-28 PROCEDURE — 99214 PR OFFICE/OUTPT VISIT, EST, LEVL IV, 30-39 MIN: ICD-10-PCS | Mod: S$PBB,,, | Performed by: INTERNAL MEDICINE

## 2020-08-28 PROCEDURE — 99214 OFFICE O/P EST MOD 30 MIN: CPT | Mod: PBBFAC | Performed by: INTERNAL MEDICINE

## 2020-08-28 PROCEDURE — 99999 PR PBB SHADOW E&M-EST. PATIENT-LVL IV: ICD-10-PCS | Mod: PBBFAC,,, | Performed by: INTERNAL MEDICINE

## 2020-08-28 PROCEDURE — 99214 OFFICE O/P EST MOD 30 MIN: CPT | Mod: S$PBB,,, | Performed by: INTERNAL MEDICINE

## 2020-08-28 PROCEDURE — 99999 PR PBB SHADOW E&M-EST. PATIENT-LVL IV: CPT | Mod: PBBFAC,,, | Performed by: INTERNAL MEDICINE

## 2020-08-28 NOTE — PROGRESS NOTES
CARDIOVASCULAR CONSULTATION    REASON FOR CONSULT:   Gala Alcaraz is a 59 y.o. female who presents for evaluation of chest pain.      HISTORY OF PRESENT ILLNESS:     Patient is a pleasant 59-year-old lady.  With a past medical history significant for recent admission for chest tightness.  Other past medical history includes diabetes, dyslipidemia and hypertension.  She was having substernal chest pressure.  No aggravating or relieving factors.  Denies any orthopnea, PND.  Could occur at rest or on exertion.  An echocardiogram was done  which showed normal left ventricular systolic function.    · Normal left ventricular systolic function. The estimated ejection fraction is 55%.  · Concentric left ventricular remodeling.  · No wall motion abnormalities.  · Normal right ventricular systolic function    Notes from July 2020:  Patient here for follow-up.  He has on and off chest pressure.  Substernal in nature, associated with shortness of breath.  On going up 1 flight of stairs.  No radiation.  EKG abnormal.  Shows sinus bradycardia with T-wave abnormalities suggestive of inferior ischemia.    Notes from August 2020:    Patient here for follow-up.  Stress test showed significant severe large ischemia in the anterior wall.  Patient denies any orthopnea, PND.  Denies resting chest pains.      The study shows abnormal myocardial perfusion.    Abnormal myocardial perfusion study.    Perfusion Defect There is a moderate to severe intensity, large sized, mostly reversible with some fixed areas defect in the basal to distal anterolateral wall(s).    Gated perfusion images showed an ejection fraction of 62% at rest and post stress.    The EKG portion of this study is abnormal but not diagnostic.    The patient reported no chest pain during the stress test.    There were no arrhythmias during stress.      · Normal left ventricular systolic function. The estimated ejection fraction is 55%.  · Concentric left  ventricular remodeling.  · No wall motion abnormalities.  · Normal right ventricular systolic function.     Notes August 27, 2020:  Patient here for follow-up.  Coronary angiogram did not reveal any significant coronary artery stenosis.  No complications from coronary angiogram.  Patient requesting clearance for GI procedure.  Clearance given.          PAST MEDICAL HISTORY:     Past Medical History:   Diagnosis Date    Anemia     Diabetes mellitus     Hyperlipidemia     Hypertension        PAST SURGICAL HISTORY:     Past Surgical History:   Procedure Laterality Date    HYSTERECTOMY      LEFT HEART CATHETERIZATION Left 8/14/2020    Procedure: Left heart cath, radial, 730;  Surgeon: César Aggarwal MD;  Location: [facility] CATH LAB;  Service: Cardiology;  Laterality: Left;  RN PREOP 8/13/2020-----COVID NEGATIVE ON 8/13       ALLERGIES AND MEDICATION:   Review of patient's allergies indicates:  No Known Allergies     Medication List          Accurate as of August 28, 2020  1:10 PM. If you have any questions, ask your nurse or doctor.            CONTINUE taking these medications    aspirin 81 MG EC tablet  Commonly known as: ECOTRIN     atorvastatin 40 MG tablet  Commonly known as: LIPITOR  Take 1 tablet (40 mg total) by mouth every evening.     chlorthalidone 25 MG Tab  Commonly known as: HYGROTEN     clopidogreL 75 mg tablet  Commonly known as: PLAVIX  Take 1 tablet (75 mg total) by mouth once daily.     ferrous sulfate 325 mg (65 mg iron) Tab tablet  Commonly known as: FEOSOL     insulin aspart U-100 100 unit/mL injection  Commonly known as: NOVOLOG     LANTUS SOLOSTAR U-100 INSULIN glargine 100 units/mL (3mL) SubQ pen  Generic drug: insulin     lisinopriL 40 MG tablet  Commonly known as: PRINIVIL,ZESTRIL     meloxicam 15 MG tablet  Commonly known as: MOBIC  Take 1 tablet (15 mg total) by mouth once daily.     metoprolol succinate 50 MG 24 hr tablet  Commonly known as: TOPROL-XL     pantoprazole 40 MG  tablet  Commonly known as: PROTONIX  Take 1 tablet (40 mg total) by mouth once daily.     potassium chloride 10 MEQ Cpsr  Commonly known as: MICRO-K            SOCIAL HISTORY:     Social History     Socioeconomic History    Marital status: Single     Spouse name: Not on file    Number of children: Not on file    Years of education: Not on file    Highest education level: Not on file   Occupational History    Not on file   Social Needs    Financial resource strain: Not on file    Food insecurity     Worry: Not on file     Inability: Not on file    Transportation needs     Medical: Not on file     Non-medical: Not on file   Tobacco Use    Smoking status: Never Smoker    Smokeless tobacco: Never Used   Substance and Sexual Activity    Alcohol use: No    Drug use: Never    Sexual activity: Not on file   Lifestyle    Physical activity     Days per week: Not on file     Minutes per session: Not on file    Stress: Not on file   Relationships    Social connections     Talks on phone: Not on file     Gets together: Not on file     Attends Hinduism service: Not on file     Active member of club or organization: Not on file     Attends meetings of clubs or organizations: Not on file     Relationship status: Not on file   Other Topics Concern    Not on file   Social History Narrative    Not on file       FAMILY HISTORY:   History reviewed. No pertinent family history.    REVIEW OF SYSTEMS:   Review of Systems   Constitution: Negative.   HENT: Negative.    Eyes: Negative.    Cardiovascular: Positive for chest pain.   Respiratory: Negative.    Endocrine: Negative.    Hematologic/Lymphatic: Negative.    Skin: Negative.    Musculoskeletal: Negative.    Gastrointestinal: Negative.    Genitourinary: Negative.    Neurological: Negative.    Psychiatric/Behavioral: Negative.    Allergic/Immunologic: Negative.        A 10 point review of systems was performed and all the pertinent positives have been mentioned. Rest of  "review of systems was negative.        PHYSICAL EXAM:     There were no vitals filed for this visit. Body mass index is 34.87 kg/m².  Weight: 98 kg (216 lb 0.8 oz)   Height: 5' 6" (167.6 cm)     Physical Exam   Constitutional: She is oriented to person, place, and time. She appears well-developed and well-nourished.   HENT:   Head: Normocephalic.   Eyes: Pupils are equal, round, and reactive to light.   Neck: Normal range of motion. Neck supple.   Cardiovascular: Normal rate and regular rhythm.   Pulmonary/Chest: Effort normal and breath sounds normal.   Abdominal: Soft. Normal appearance and bowel sounds are normal. There is no abdominal tenderness.   Musculoskeletal: Normal range of motion.   Neurological: She is alert and oriented to person, place, and time.   Skin: Skin is warm.   Psychiatric: She has a normal mood and affect.   Nursing note and vitals reviewed.        DATA:     Laboratory:  CBC:  Recent Labs   Lab 04/27/20 0311 08/13/20  1015   WBC 7.62 7.08   Hemoglobin 11.1 L 12.5   Hematocrit 37.0 40.7   Platelets 260 291       CHEMISTRIES:  Recent Labs   Lab 04/27/20 0311 08/13/20  1015   Glucose 320 H 505 HH   Sodium 138 135 L   Potassium 3.9 4.1   BUN, Bld 13 23   Creatinine 1.2 1.6 H   eGFR if  56 A 40 A   eGFR if non  49 A 35 A   Calcium 9.6 9.6       CARDIAC BIOMARKERS:  Recent Labs   Lab 04/26/20 2046 04/27/20 0311 04/27/20  0956   Troponin I 0.025 0.028 H 0.010       COAGS:        LIPIDS/LFTS:  Recent Labs   Lab 04/26/20 2046 04/27/20 0311   Cholesterol 180  --    Triglycerides 181 H  --    HDL 48  --    LDL Cholesterol 95.8  --    Non-HDL Cholesterol 132  --    AST  --  9 L   ALT  --  8 L       Hemoglobin A1C   Date Value Ref Range Status   08/14/2020 >14.0 (H) 4.0 - 5.6 % Final     Comment:     ADA Screening Guidelines:  5.7-6.4%  Consistent with prediabetes  >or=6.5%  Consistent with diabetes  High levels of fetal hemoglobin interfere with the HbA1C  assay. " Heterozygous hemoglobin variants (HbS, HgC, etc)do  not significantly interfere with this assay.   However, presence of multiple variants may affect accuracy.     03/04/2020 9.3 (H) 4.0 - 5.6 % Final       TSH  Recent Labs   Lab 04/26/20 2046   TSH 0.491       The 10-year ASCVD risk score (Philip SCHULZ Jr., et al., 2013) is: 19.5%    Values used to calculate the score:      Age: 59 years      Sex: Female      Is Non- : Yes      Diabetic: Yes      Tobacco smoker: No      Systolic Blood Pressure: 138 mmHg      Is BP treated: Yes      HDL Cholesterol: 48 mg/dL      Total Cholesterol: 180 mg/dL             ASSESSMENT AND PLAN     Patient Active Problem List   Diagnosis    Chest pain    DM (diabetes mellitus)    Hypertension    HLD (hyperlipidemia)    Anemia    Obesity (BMI 30-39.9)    Dyslipidemia    Morbid obesity    CAD (coronary artery disease)       Chest pressure in a patient with multiple risk factors for coronary artery disease with abnormal stress test.  Subsequent coronary angiogram did not reveal any significant coronary artery stenosis.      Weight loss    Clearance given for GI surgery      Follow-up in 4 m      Thank you very much for involving me in the care of your patient.  Please do not hesitate to contact me if there are any questions.      César Aggarwal MD, FACC, [facility]  Interventional Cardiologist, [facility].           This note was dictated with the help of speech recognition software.  There might be un-intended errors and/or substitutions.

## 2020-08-28 NOTE — DISCHARGE SUMMARY
[facility]  Brief Operative Note     SUMMARY     Surgery Date: 8/14/2020     Surgeon(s) and Role:     * César Aggarwal MD - Primary    Assisting Surgeon: None    Pre-op Diagnosis:  Coronary artery disease of native artery of native heart with stable angina pectoris [I25.118]    Post-op Diagnosis:  Post-Op Diagnosis Codes:     * Coronary artery disease of native artery of native heart with stable angina pectoris [I25.118]    Procedure(s) (LRB):  Left heart cath, radial, 730 (Left)    Anesthesia: RN IV Sedation    Description of the findings of the procedure:  Patient underwent cardiac catheterization.  Normal coronnaries    Findings/Key Components: as above    Estimated Blood Loss: < 10 cc         Specimens: None    Diet:     Activity: ad bill.    Discharge Note    SUMMARY     Admit Date: 8/14/2020    Discharge Date and Time: 8/14/2020 11:31 AM    Hospital Course (synopsis of major diagnoses, care, treatment, and services provided during the course of the hospital stay): patient underwent cardiac cath. Observed. No complications. dced home after obs     Final Diagnosis: Post-Op Diagnosis Codes:     * Coronary artery disease of native artery of native heart with stable angina pectoris [I25.118]    Disposition: Home or Self Care    Follow Up/Patient Instructions:     Medications:  Reconciled Home Medications:      Medication List      CONTINUE taking these medications    aspirin 81 MG EC tablet  Commonly known as: ECOTRIN  Take 81 mg by mouth once daily.     atorvastatin 40 MG tablet  Commonly known as: LIPITOR  Take 1 tablet (40 mg total) by mouth every evening.     chlorthalidone 25 MG Tab  Commonly known as: HYGROTEN  Take 25 mg by mouth once daily.     clopidogreL 75 mg tablet  Commonly known as: PLAVIX  Take 1 tablet (75 mg total) by mouth once daily.     ferrous sulfate 325 mg (65 mg iron) Tab tablet  Commonly known as: FEOSOL  Take 325 mg by mouth daily with breakfast.     insulin aspart U-100  100 unit/mL injection  Commonly known as: NOVOLOG  Inject 8 Units into the skin 3 (three) times daily before meals.     LANTUS SOLOSTAR U-100 INSULIN glargine 100 units/mL (3mL) SubQ pen  Generic drug: insulin  Inject 12 Units into the skin every evening.     lisinopriL 40 MG tablet  Commonly known as: PRINIVIL,ZESTRIL  Take 40 mg by mouth once daily.     meloxicam 15 MG tablet  Commonly known as: MOBIC  Take 1 tablet (15 mg total) by mouth once daily.     metoprolol succinate 50 MG 24 hr tablet  Commonly known as: TOPROL-XL  Take 100 mg by mouth once daily.     pantoprazole 40 MG tablet  Commonly known as: PROTONIX  Take 1 tablet (40 mg total) by mouth once daily.     potassium chloride 10 MEQ Cpsr  Commonly known as: MICRO-K  Take 10 mEq by mouth once.          Discharge Procedure Orders   Diet Cardiac     Call MD for:  temperature >100.4     Call MD for:  persistent nausea and vomiting     Call MD for:  severe uncontrolled pain     Call MD for:  difficulty breathing, headache or visual disturbances     Call MD for:  redness, tenderness, or signs of infection (pain, swelling, redness, odor or green/yellow discharge around incision site)     Call MD for:  hives     Call MD for:  persistent dizziness or light-headedness     Call MD for:  extreme fatigue     Follow-up Information     César Aggarwal MD.    Specialties: Cardiology, INTERVENTIONAL CARDIOLOGY  Contact information:  [address]  [address]  [facility] [id number] [phone]                     Diet:     Activity: ad bill.

## 2021-02-22 ENCOUNTER — OFFICE VISIT (OUTPATIENT)
Dept: CARDIOLOGY | Facility: CLINIC | Age: 61
End: 2021-02-22
Payer: MEDICAID

## 2021-02-22 VITALS
HEART RATE: 70 BPM | DIASTOLIC BLOOD PRESSURE: 80 MMHG | BODY MASS INDEX: 37.93 KG/M2 | WEIGHT: 236 LBS | HEIGHT: 66 IN | OXYGEN SATURATION: 98 % | SYSTOLIC BLOOD PRESSURE: 134 MMHG

## 2021-02-22 DIAGNOSIS — E11.69 TYPE 2 DIABETES MELLITUS WITH OTHER SPECIFIED COMPLICATION, UNSPECIFIED WHETHER LONG TERM INSULIN USE: ICD-10-CM

## 2021-02-22 DIAGNOSIS — E78.5 DYSLIPIDEMIA: ICD-10-CM

## 2021-02-22 DIAGNOSIS — E66.9 OBESITY (BMI 30-39.9): ICD-10-CM

## 2021-02-22 DIAGNOSIS — I10 ESSENTIAL HYPERTENSION: ICD-10-CM

## 2021-02-22 DIAGNOSIS — E78.5 HYPERLIPIDEMIA, UNSPECIFIED HYPERLIPIDEMIA TYPE: ICD-10-CM

## 2021-02-22 DIAGNOSIS — I25.118 CORONARY ARTERY DISEASE OF NATIVE ARTERY OF NATIVE HEART WITH STABLE ANGINA PECTORIS: Primary | ICD-10-CM

## 2021-02-22 PROCEDURE — 99999 PR PBB SHADOW E&M-EST. PATIENT-LVL IV: CPT | Mod: PBBFAC,,, | Performed by: INTERNAL MEDICINE

## 2021-02-22 PROCEDURE — 93010 ELECTROCARDIOGRAM REPORT: CPT | Mod: S$PBB,,, | Performed by: INTERNAL MEDICINE

## 2021-02-22 PROCEDURE — 99214 PR OFFICE/OUTPT VISIT, EST, LEVL IV, 30-39 MIN: ICD-10-PCS | Mod: S$PBB,,, | Performed by: INTERNAL MEDICINE

## 2021-02-22 PROCEDURE — 93010 EKG 12-LEAD: ICD-10-PCS | Mod: S$PBB,,, | Performed by: INTERNAL MEDICINE

## 2021-02-22 PROCEDURE — 99214 OFFICE O/P EST MOD 30 MIN: CPT | Mod: PBBFAC,25 | Performed by: INTERNAL MEDICINE

## 2021-02-22 PROCEDURE — 99999 PR PBB SHADOW E&M-EST. PATIENT-LVL IV: ICD-10-PCS | Mod: PBBFAC,,, | Performed by: INTERNAL MEDICINE

## 2021-02-22 PROCEDURE — 99214 OFFICE O/P EST MOD 30 MIN: CPT | Mod: S$PBB,,, | Performed by: INTERNAL MEDICINE

## 2021-02-22 PROCEDURE — 93005 ELECTROCARDIOGRAM TRACING: CPT | Mod: PBBFAC | Performed by: INTERNAL MEDICINE

## 2022-04-15 NOTE — Clinical Note
Pulse oximeter placed on patient's right thumb. Adjacent Tissue Transfer Text: The defect edges were debeveled with a #15 scalpel blade.  Given the location of the defect and the proximity to free margins an adjacent tissue transfer was deemed most appropriate.  Using a sterile surgical marker, an appropriate flap was drawn incorporating the defect and placing the expected incisions within the relaxed skin tension lines where possible.    The area thus outlined was incised deep to adipose tissue with a #15 scalpel blade.  The skin margins were undermined to an appropriate distance in all directions utilizing iris scissors.

## (undated) DEVICE — WIRE GUIDE SAFE-T-J .035 260CM

## (undated) DEVICE — CATH DXTERITY JL35 100CM 5FR

## (undated) DEVICE — CATH EMPULSE ANGLED 5FR PIGTAI

## (undated) DEVICE — CONTRAST VISIPAQUE 150ML

## (undated) DEVICE — KIT MANIFOLD LOW PRESS TUBING

## (undated) DEVICE — PAD DEFIB CADENCE ADULT R2

## (undated) DEVICE — KIT GLIDESHEATH SLEND 6FR 10CM

## (undated) DEVICE — KIT SYR REUSABLE

## (undated) DEVICE — KIT HAND CONTROL HIGH PRESSUR

## (undated) DEVICE — CATH DXTERITY JR40 100CM 5FR

## (undated) DEVICE — PACK CATH LAB

## (undated) DEVICE — HEMOSTAT VASC BAND REG 24CM